# Patient Record
Sex: FEMALE | Race: WHITE | NOT HISPANIC OR LATINO | URBAN - METROPOLITAN AREA
[De-identification: names, ages, dates, MRNs, and addresses within clinical notes are randomized per-mention and may not be internally consistent; named-entity substitution may affect disease eponyms.]

---

## 2020-10-08 ENCOUNTER — INPATIENT (INPATIENT)
Facility: HOSPITAL | Age: 85
LOS: 5 days | Discharge: HOME | DRG: 754 | End: 2020-10-14
Attending: INTERNAL MEDICINE | Admitting: INTERNAL MEDICINE
Payer: MEDICARE

## 2020-10-08 VITALS
RESPIRATION RATE: 16 BRPM | OXYGEN SATURATION: 99 % | SYSTOLIC BLOOD PRESSURE: 188 MMHG | HEART RATE: 69 BPM | HEIGHT: 63 IN | WEIGHT: 98.99 LBS | DIASTOLIC BLOOD PRESSURE: 75 MMHG | TEMPERATURE: 98 F

## 2020-10-08 LAB
ALBUMIN SERPL ELPH-MCNC: 4.1 G/DL — SIGNIFICANT CHANGE UP (ref 3.3–5)
ALP SERPL-CCNC: 67 U/L — SIGNIFICANT CHANGE UP (ref 40–120)
ALT FLD-CCNC: 10 U/L — SIGNIFICANT CHANGE UP (ref 10–45)
ANION GAP SERPL CALC-SCNC: 13 MMOL/L — SIGNIFICANT CHANGE UP (ref 5–17)
ANISOCYTOSIS BLD QL: SLIGHT — SIGNIFICANT CHANGE UP
APTT BLD: 32.9 SEC — SIGNIFICANT CHANGE UP (ref 27.5–35.5)
AST SERPL-CCNC: 16 U/L — SIGNIFICANT CHANGE UP (ref 10–40)
BASOPHILS # BLD AUTO: 0 K/UL — SIGNIFICANT CHANGE UP (ref 0–0.2)
BASOPHILS NFR BLD AUTO: 0 % — SIGNIFICANT CHANGE UP (ref 0–2)
BILIRUB SERPL-MCNC: 0.3 MG/DL — SIGNIFICANT CHANGE UP (ref 0.2–1.2)
BLD GP AB SCN SERPL QL: NEGATIVE — SIGNIFICANT CHANGE UP
BUN SERPL-MCNC: 28 MG/DL — HIGH (ref 7–23)
CALCIUM SERPL-MCNC: 9.2 MG/DL — SIGNIFICANT CHANGE UP (ref 8.4–10.5)
CHLORIDE SERPL-SCNC: 101 MMOL/L — SIGNIFICANT CHANGE UP (ref 96–108)
CO2 SERPL-SCNC: 26 MMOL/L — SIGNIFICANT CHANGE UP (ref 22–31)
CREAT SERPL-MCNC: 1.01 MG/DL — SIGNIFICANT CHANGE UP (ref 0.5–1.3)
ELLIPTOCYTES BLD QL SMEAR: SLIGHT — SIGNIFICANT CHANGE UP
EOSINOPHIL # BLD AUTO: 0 K/UL — SIGNIFICANT CHANGE UP (ref 0–0.5)
EOSINOPHIL NFR BLD AUTO: 0 % — SIGNIFICANT CHANGE UP (ref 0–6)
GLUCOSE SERPL-MCNC: 112 MG/DL — HIGH (ref 70–99)
HCT VFR BLD CALC: 30.1 % — LOW (ref 34.5–45)
HGB BLD-MCNC: 9.2 G/DL — LOW (ref 11.5–15.5)
HYPOCHROMIA BLD QL: SLIGHT — SIGNIFICANT CHANGE UP
INR BLD: 1.06 RATIO — SIGNIFICANT CHANGE UP (ref 0.88–1.16)
LYMPHOCYTES # BLD AUTO: 0.27 K/UL — LOW (ref 1–3.3)
LYMPHOCYTES # BLD AUTO: 2.6 % — LOW (ref 13–44)
MANUAL SMEAR VERIFICATION: SIGNIFICANT CHANGE UP
MCHC RBC-ENTMCNC: 22.2 PG — LOW (ref 27–34)
MCHC RBC-ENTMCNC: 30.6 GM/DL — LOW (ref 32–36)
MCV RBC AUTO: 72.7 FL — LOW (ref 80–100)
MICROCYTES BLD QL: SIGNIFICANT CHANGE UP
MONOCYTES # BLD AUTO: 0.18 K/UL — SIGNIFICANT CHANGE UP (ref 0–0.9)
MONOCYTES NFR BLD AUTO: 1.8 % — LOW (ref 2–14)
NEUTROPHILS # BLD AUTO: 8.95 K/UL — HIGH (ref 1.8–7.4)
NEUTROPHILS NFR BLD AUTO: 87.7 % — HIGH (ref 43–77)
PLAT MORPH BLD: ABNORMAL
PLATELET # BLD AUTO: 367 K/UL — SIGNIFICANT CHANGE UP (ref 150–400)
POIKILOCYTOSIS BLD QL AUTO: SLIGHT — SIGNIFICANT CHANGE UP
POLYCHROMASIA BLD QL SMEAR: SLIGHT — SIGNIFICANT CHANGE UP
POTASSIUM SERPL-MCNC: 4.1 MMOL/L — SIGNIFICANT CHANGE UP (ref 3.5–5.3)
POTASSIUM SERPL-SCNC: 4.1 MMOL/L — SIGNIFICANT CHANGE UP (ref 3.5–5.3)
PROT SERPL-MCNC: 6.7 G/DL — SIGNIFICANT CHANGE UP (ref 6–8.3)
PROTHROM AB SERPL-ACNC: 12.7 SEC — SIGNIFICANT CHANGE UP (ref 10.6–13.6)
RBC # BLD: 4.14 M/UL — SIGNIFICANT CHANGE UP (ref 3.8–5.2)
RBC # FLD: 15.4 % — HIGH (ref 10.3–14.5)
RBC BLD AUTO: ABNORMAL
RH IG SCN BLD-IMP: POSITIVE — SIGNIFICANT CHANGE UP
SCHISTOCYTES BLD QL AUTO: SLIGHT — SIGNIFICANT CHANGE UP
SODIUM SERPL-SCNC: 140 MMOL/L — SIGNIFICANT CHANGE UP (ref 135–145)
VARIANT LYMPHS # BLD: 7.9 % — HIGH (ref 0–6)
WBC # BLD: 10.2 K/UL — SIGNIFICANT CHANGE UP (ref 3.8–10.5)
WBC # FLD AUTO: 10.2 K/UL — SIGNIFICANT CHANGE UP (ref 3.8–10.5)

## 2020-10-08 PROCEDURE — 99285 EMERGENCY DEPT VISIT HI MDM: CPT

## 2020-10-08 PROCEDURE — 74177 CT ABD & PELVIS W/CONTRAST: CPT | Mod: 26

## 2020-10-08 RX ORDER — HALOPERIDOL DECANOATE 100 MG/ML
2 INJECTION INTRAMUSCULAR ONCE
Refills: 0 | Status: DISCONTINUED | OUTPATIENT
Start: 2020-10-08 | End: 2020-10-08

## 2020-10-08 RX ORDER — HALOPERIDOL DECANOATE 100 MG/ML
2 INJECTION INTRAMUSCULAR ONCE
Refills: 0 | Status: COMPLETED | OUTPATIENT
Start: 2020-10-08 | End: 2020-10-08

## 2020-10-08 RX ADMIN — HALOPERIDOL DECANOATE 2 MILLIGRAM(S): 100 INJECTION INTRAMUSCULAR at 23:43

## 2020-10-08 NOTE — ED PROVIDER NOTE - PHYSICAL EXAMINATION
GEN: AxOx3, NAD, non-toxic appearing.  Skin: dry and moist.  HEAD: atraumatic, normocephalic.  EENT: PERRLA, EOMIs.  CHEST: CTAB no wheezes, rhonchi or rales.  CV: RRR no gallops, murmurs or rubs  ABD: No masses, abnormal pulsations or lesions. +BS in all quadrants. NT/ND in all 4 quadrants.   MSK: FROM all extremities

## 2020-10-08 NOTE — ED PROVIDER NOTE - OBJECTIVE STATEMENT
86 y/o F w/ PMHx of HTN HLD presenting with abdominal cramping and vaginal bleeding. Pt accompanied by her daughter who is providing additional information about the pt. The bleeding has been occurring "each month" and has been going on for a few months. The pt is a poor historian who cannot recall if her bleeding is occurring after a BM, urination or if it is menses. She states the blood is bright red and notes it in her underwear. The pt underwent menopause around age 52. 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 Pt denies any fevers, chills, HA, SOB, palpitations, diarrhea, lightheadedness, CP, urinary or bowel incontinence, hx of abdominal surgeries. 86 y/o F w/ PMHx of HTN HLD presenting with abdominal cramping and vaginal bleeding. Pt accompanied by her daughter who is providing additional information about the pt. The bleeding has been occurring "each month" and has been going on for a few months. The pt is a poor historian who cannot recall if her bleeding is occurring after a BM, urination or if it is menses. She states the blood is bright red and notes it in her underwear. The pt underwent menopause around age 52. 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 Pt denies any fevers, chills, HA, SOB, palpitations, diarrhea, lightheadedness, CP, urinary or bowel incontinence, hx of abdominal surgeries. Not on AC.

## 2020-10-08 NOTE — ED PROVIDER NOTE - PROGRESS NOTE DETAILS
Attending MD Philip.  Attempted Dr. Burdick twice and called hospitalist who advised case discussed with dr nicholas, ct with diverticulosis. Attending MD Philip.  Attempted Dr. Burdick twice and called hospitalist who advised admission to Dr. Whitmore.  Pt refusing admission however lacks any clinical decision making capacity as she continues to state she 'just has [her] period' and wants to go home because she is not bleeding from her rectum.  Also forgets why she is here and states she is just here for a checkup.  Daughter present and amenable to admission, pt's  (Rashid) and other daughter (Ida) called as well and counseled re: recs to admit 2/2 concern for diverticular bleed and risk of acute worsening as pt has had several grossly bloody movements in ED though currently remains stable and not requiring acute transfusion.  PT dosed haldol for improved pt care and ability to relax.  Pt signed out to incoming team pending CTAP results and admission to Dr. Whitmore. Stable at time of signout.

## 2020-10-08 NOTE — ED ADULT NURSE NOTE - OBJECTIVE STATEMENT
86 y/o Female PMH HTN and HLD presenting to ED accompanied by her daughter whom is providing additional hx d/t pt being a poor historian c/o abdominal cramping and vaginal bleeding. pt states that the bleeding has been occurring "each month" and has been going on for a few months. d/t pt being unable to provide detailed hx she is unsure when her last bowel movement was. describes the bleeding as being bright red in nature, denies any fevers, chills, weakness, nausea, vomiting, dizziness, blood in her stool, sick contacts or recent travel. VS stable. labs and line obtained, results pending. safety and fall precautions maintained, call bell at the bedside and within reach. daughter remains with the pt at the bedside.

## 2020-10-08 NOTE — ED PROVIDER NOTE - ATTENDING CONTRIBUTION TO CARE
Attending MD Philip.  Agree with above.  Pt has hx of HTN, HLD and hypothyroidism.  She is pleasantly confused and states that her 'bleeding is just [her] period'.  On exam (performed by me with PA student as chaperone and daughter in room) pt has no blood in vaginal vault but gross crimson blood on PARUL.  No active oozing however blood on pt's underwear.  Pt endorses intermittent severe pelvic and lower back cramping as well as bleeding over the last few mos.  Pt denies CP/SOB/light-headedness.  Planned lab work and CTAP.  Pt is not currently anticoagulated.

## 2020-10-09 DIAGNOSIS — N95.0 POSTMENOPAUSAL BLEEDING: ICD-10-CM

## 2020-10-09 DIAGNOSIS — K92.2 GASTROINTESTINAL HEMORRHAGE, UNSPECIFIED: ICD-10-CM

## 2020-10-09 LAB
BASOPHILS # BLD AUTO: 0.06 K/UL — SIGNIFICANT CHANGE UP (ref 0–0.2)
BASOPHILS NFR BLD AUTO: 0.6 % — SIGNIFICANT CHANGE UP (ref 0–2)
EOSINOPHIL # BLD AUTO: 0.14 K/UL — SIGNIFICANT CHANGE UP (ref 0–0.5)
EOSINOPHIL NFR BLD AUTO: 1.4 % — SIGNIFICANT CHANGE UP (ref 0–6)
HCT VFR BLD CALC: 28.9 % — LOW (ref 34.5–45)
HGB BLD-MCNC: 9.3 G/DL — LOW (ref 11.5–15.5)
IMM GRANULOCYTES NFR BLD AUTO: 0.4 % — SIGNIFICANT CHANGE UP (ref 0–1.5)
LYMPHOCYTES # BLD AUTO: 2.42 K/UL — SIGNIFICANT CHANGE UP (ref 1–3.3)
LYMPHOCYTES # BLD AUTO: 24.8 % — SIGNIFICANT CHANGE UP (ref 13–44)
MCHC RBC-ENTMCNC: 23 PG — LOW (ref 27–34)
MCHC RBC-ENTMCNC: 32.2 GM/DL — SIGNIFICANT CHANGE UP (ref 32–36)
MCV RBC AUTO: 71.5 FL — LOW (ref 80–100)
MONOCYTES # BLD AUTO: 1.02 K/UL — HIGH (ref 0–0.9)
MONOCYTES NFR BLD AUTO: 10.4 % — SIGNIFICANT CHANGE UP (ref 2–14)
NEUTROPHILS # BLD AUTO: 6.09 K/UL — SIGNIFICANT CHANGE UP (ref 1.8–7.4)
NEUTROPHILS NFR BLD AUTO: 62.4 % — SIGNIFICANT CHANGE UP (ref 43–77)
NRBC # BLD: 0 /100 WBCS — SIGNIFICANT CHANGE UP (ref 0–0)
PLATELET # BLD AUTO: 377 K/UL — SIGNIFICANT CHANGE UP (ref 150–400)
RBC # BLD: 4.04 M/UL — SIGNIFICANT CHANGE UP (ref 3.8–5.2)
RBC # FLD: 15.4 % — HIGH (ref 10.3–14.5)
SARS-COV-2 IGG SERPL QL IA: NEGATIVE — SIGNIFICANT CHANGE UP
SARS-COV-2 IGM SERPL IA-ACNC: <0.1 INDEX — SIGNIFICANT CHANGE UP
SARS-COV-2 RNA SPEC QL NAA+PROBE: SIGNIFICANT CHANGE UP
WBC # BLD: 9.77 K/UL — SIGNIFICANT CHANGE UP (ref 3.8–10.5)
WBC # FLD AUTO: 9.77 K/UL — SIGNIFICANT CHANGE UP (ref 3.8–10.5)

## 2020-10-09 PROCEDURE — 99233 SBSQ HOSP IP/OBS HIGH 50: CPT

## 2020-10-09 RX ORDER — LANOLIN ALCOHOL/MO/W.PET/CERES
3 CREAM (GRAM) TOPICAL ONCE
Refills: 0 | Status: COMPLETED | OUTPATIENT
Start: 2020-10-09 | End: 2020-10-12

## 2020-10-09 RX ORDER — PANTOPRAZOLE SODIUM 20 MG/1
40 TABLET, DELAYED RELEASE ORAL
Refills: 0 | Status: DISCONTINUED | OUTPATIENT
Start: 2020-10-09 | End: 2020-10-14

## 2020-10-09 RX ORDER — AMLODIPINE BESYLATE 2.5 MG/1
10 TABLET ORAL DAILY
Refills: 0 | Status: DISCONTINUED | OUTPATIENT
Start: 2020-10-09 | End: 2020-10-14

## 2020-10-09 RX ORDER — LEVOTHYROXINE SODIUM 125 MCG
50 TABLET ORAL DAILY
Refills: 0 | Status: DISCONTINUED | OUTPATIENT
Start: 2020-10-09 | End: 2020-10-14

## 2020-10-09 RX ORDER — ATENOLOL 25 MG/1
50 TABLET ORAL DAILY
Refills: 0 | Status: DISCONTINUED | OUTPATIENT
Start: 2020-10-09 | End: 2020-10-14

## 2020-10-09 RX ORDER — POLYETHYLENE GLYCOL 3350 17 G/17G
17 POWDER, FOR SOLUTION ORAL DAILY
Refills: 0 | Status: DISCONTINUED | OUTPATIENT
Start: 2020-10-09 | End: 2020-10-09

## 2020-10-09 RX ORDER — SENNA PLUS 8.6 MG/1
2 TABLET ORAL AT BEDTIME
Refills: 0 | Status: DISCONTINUED | OUTPATIENT
Start: 2020-10-09 | End: 2020-10-09

## 2020-10-09 RX ORDER — ATORVASTATIN CALCIUM 80 MG/1
10 TABLET, FILM COATED ORAL AT BEDTIME
Refills: 0 | Status: DISCONTINUED | OUTPATIENT
Start: 2020-10-09 | End: 2020-10-14

## 2020-10-09 RX ADMIN — ATENOLOL 50 MILLIGRAM(S): 25 TABLET ORAL at 12:51

## 2020-10-09 RX ADMIN — ATORVASTATIN CALCIUM 10 MILLIGRAM(S): 80 TABLET, FILM COATED ORAL at 21:36

## 2020-10-09 RX ADMIN — AMLODIPINE BESYLATE 10 MILLIGRAM(S): 2.5 TABLET ORAL at 12:50

## 2020-10-09 RX ADMIN — Medication 50 MICROGRAM(S): at 12:50

## 2020-10-09 RX ADMIN — PANTOPRAZOLE SODIUM 40 MILLIGRAM(S): 20 TABLET, DELAYED RELEASE ORAL at 12:50

## 2020-10-09 NOTE — H&P ADULT - NSHPPHYSICALEXAM_GEN_ALL_CORE
PHYSICAL EXAMINATION:  Vital Signs Last 24 Hrs  T(C): 36.5 (09 Oct 2020 05:45), Max: 36.9 (08 Oct 2020 22:00)  T(F): 97.7 (09 Oct 2020 05:45), Max: 98.5 (08 Oct 2020 22:00)  HR: 71 (09 Oct 2020 05:45) (62 - 74)  BP: 173/71 (09 Oct 2020 05:45) (138/79 - 188/75)  BP(mean): --  RR: 19 (09 Oct 2020 05:45) (16 - 19)  SpO2: 96% (09 Oct 2020 05:45) (95% - 100%)  CAPILLARY BLOOD GLUCOSE          GENERAL: NAD, well-groomed, well-developed  HEAD:  atraumatic, normocephalic  EYES: sclera anicteric  ENMT: mucous membranes moist  NECK: supple, No JVD  CHEST/LUNG: clear to auscultation bilaterally; no rales, rhonchi, or wheezing b/l  HEART: normal S1, S2  ABDOMEN: BS+, soft, ND, NT   EXTREMITIES:  pulses palpable; no clubbing, cyanosis, or edema b/l LEs  NEURO: awake, alert, interactive; moves all extremities  SKIN: no rashes or lesions PHYSICAL EXAMINATION:  Vital Signs Last 24 Hrs  T(C): 36.5 (09 Oct 2020 05:45), Max: 36.9 (08 Oct 2020 22:00)  T(F): 97.7 (09 Oct 2020 05:45), Max: 98.5 (08 Oct 2020 22:00)  HR: 71 (09 Oct 2020 05:45) (62 - 74)  BP: 173/71 (09 Oct 2020 05:45) (138/79 - 188/75)  BP(mean): --  RR: 19 (09 Oct 2020 05:45) (16 - 19)  SpO2: 96% (09 Oct 2020 05:45) (95% - 100%)  CAPILLARY BLOOD GLUCOSE          GENERAL: NAD, well-groomed, well-developed, seen on 9 [address] with daughter  HEAD:  atraumatic, normocephalic  EYES: sclera anicteric  ENMT: mucous membranes moist  NECK: supple, No JVD  CHEST/LUNG: clear to auscultation bilaterally; no rales, rhonchi, or wheezing b/l  HEART: normal S1, S2  ABDOMEN: BS+, soft, ND, NT   EXTREMITIES:  pulses palpable; no clubbing, cyanosis, or edema b/l LEs  NEURO: awake, alert, interactive; moves all extremities  SKIN: no rashes or lesions

## 2020-10-09 NOTE — CONSULT NOTE ADULT - SUBJECTIVE AND OBJECTIVE BOX
Patient is a 87y old  Female who presents with a chief complaint of GI bleed (09 Oct 2020 08:31)      HPI:  86 y/o female PMH HTN, HLD and dementia presenting with abdominal cramping and vaginal bleeding. Pt accompanied by her daughter who is providing additional information about the pt. The bleeding has been occurring "each month" and has been going on for a few months. The pt is a poor historian who cannot recall if her bleeding is occurring after a BM, urination or if it is menses. She states the blood is bright red and notes it in her underwear. The pt underwent menopause around age 52. 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 Pt denies any fevers, chills, HA, SOB, palpitations, diarrhea, lightheadedness, CP, urinary or bowel incontinence, hx of abdominal surgeries. Not on AC. (09 Oct 2020 08:31)      daughter at bedside to help corroborate history    Pt reports "periods" for several years despite menopause at ~age 52  reported regular daily BMs- pt reports typically light brown in color - but in PARUL in ER noted to have blood/crimson colored)  no nausea or vomiting  no reported passage of clots per rectum  Pt may have had colonoscopy in past, but unclear as per daughter- if had then "decades ago"  no weight  loss, anorexia    Pt reports eating beets weekly - but daughter admits pt is forgetful and not an accurate historian  per daughter, long standing history of anemia  no BM today    PAST MEDICAL & SURGICAL HISTORY:  HLD (hyperlipidemia)  HTN (hypertension)  Dementia      Allergies  No Known Allergies      MEDICATIONS  (STANDING):  amLODIPine   Tablet 10 milliGRAM(s) Oral daily  ATENolol  Tablet 50 milliGRAM(s) Oral daily  atorvastatin 10 milliGRAM(s) Oral at bedtime  levothyroxine 50 MICROGram(s) Oral daily      Social History:   lives with spouse  no tobacco or ETOH    Family History   IBD (  ) Yes   (X  ) No  GI Malignancy (  )  Yes    ( X ) No    Health Management  Last Colonoscopy - unsure of date/if at all, then was decades ago as per daughter at bedside      Advanced Directives: (  X   ) None    (      ) DNR    (     ) DNI    (     ) Health Care Proxy:     Review of Systems:    General:  No wt loss, fevers, chills, night sweats,fatigue  CV:  No pain, palpitations, hypo/hypertension  Resp:  No dyspnea, cough, tachypnea, wheezing  GI:  see HPI  :  No pain, bleeding, incontinence, nocturia  Muscle:  No pain, weakness  Neuro:  No focal weakness, tingling, +dementia  Psych:  No fatigue, insomnia, mood problems, depression  Endocrine:  No polyuria, polydypsia, cold/heat intolerance  Heme:  No petechiae, ecchymosis, easy bruisability  Skin:  No rash, tattoos, scars, edema      Vital Signs Last 24 Hrs  T(C): 36.7 (09 Oct 2020 10:36), Max: 36.9 (08 Oct 2020 22:00)  T(F): 98 (09 Oct 2020 10:36), Max: 98.5 (08 Oct 2020 22:00)  HR: 65 (09 Oct 2020 10:36) (62 - 74)  BP: 107/57 (09 Oct 2020 10:36) (107/57 - 188/75)  BP(mean): --  RR: 18 (09 Oct 2020 10:36) (16 - 19)  SpO2: 98% (09 Oct 2020 10:36) (95% - 100%)    PHYSICAL EXAM:    Constitutional: NAD, well-developed pleasantly confused elderly female. daughter at bedside  Neck: No LAD, supple  Respiratory: Clear b/l  Cardiovascular: S1 and S2, Regular  Gastrointestinal: BS+, soft, NT/ND, neg HSM,  Rectal: normal tone +maroon colored stool, no melena. no palpable lesions  Extremities: No peripheral edema, neg clubbing, cyanosis  Vascular: 2+ peripheral pulses  Neurological: alert and appropriate but forgetful; pleasantly confused. no focal asymmetry  Psychiatric: Normal mood, normal affect  Skin: No rashes        LABS:                        9.3    9.77  )-----------( 377      ( 09 Oct 2020 00:10 )             28.9   Hemoglobin: 9.2 g/dL (10.08.20 @ 20:25)       140  |  101  |  28<H>  ----------------------------<  112<H>  4.1   |  26  |  1.01    Ca    9.2      08 Oct 2020 20:25    TPro  6.7  /  Alb  4.1  /  TBili  0.3  /  DBili  x   /  AST  16  /  ALT  10  /  AlkPhos  67  10-08    PT/INR - ( 08 Oct 2020 20:25 )   PT: 12.7 sec;   INR: 1.06 ratio    PTT - ( 08 Oct 2020 20:25 )  PTT:32.9 sec    COVID-19 PCR . (10.09.20 @ 00:32)   COVID-19 PCR: NotDetec:   RADIOLOGY & ADDITIONAL TESTS:  EXAM:  CT ABDOMEN AND PELVIS IC                        PROCEDURE DATE:  10/08/2020        INTERPRETATION:  CLINICAL INFORMATION: Abdominal pain and vaginal bleeding. Evaluate for acute diverticulitis.    COMPARISON: None.    PROCEDURE:  CT of the Abdomen and Pelvis was performed with intravenous contrast.  Intravenous contrast: 90 ml Omnipaque 350. 10 ml discarded.  Oral contrast: None.  Sagittal and coronal reformats were performed.    FINDINGS: Patient's respiratory motion degrades images.    LOWER CHEST: Elevated right hemidiaphragm. Subsegmental atelectasis.    LIVER: A 2.6 x 2.8 cm cyst in the left lobe. Indeterminate 1.5 x 1.2 cm hypodense lesion with greater than fluid attenuation in the right lobe.  GALLBLADDER/BILE DUCTS: No intrahepatic biliary dilatation. Mild common bile duct dilatation (0.8 cm). Distended gallbladder. No significant gallbladder edema.  PANCREAS: Unremarkable.  SPLEEN: Unremarkable.    ADRENALS: Unremarkable.  KIDNEYS/URETERS: No hydronephrosis, hydroureter or significant perinephric stranding. A 1.7 x 1.1 cm left renal cyst. Subcentimeter hypodense foci in the kidneys, too small to characterize.  BLADDER: Partially distended. Prominent wall.  REPRODUCTIVE ORGANS: Endometrial thickening (1.4 cm).No obvious adnexal mass. Nonspecific calcifications in bilateral adnexa.    BOWEL: No bowel obstruction. Unremarkable appendix. Prominent wall of the cecum/ascending colon junction and proximal sigmoid colon without significant peridiverticular stranding. Colon diverticulosis.  PERITONEUM: No drainable fluid collection or free air.  VESSELS: Atherosclerosis. Normal caliber of the abdominal aorta.  RETROPERITONEUM: No lymphadenopathy.  ABDOMINAL WALL/SOFT TISSUES: Unremarkable.  BONES: Transitional lumbosacral anatomy. S-shaped curvature and degenerative changes of the spine. Nonspecific small sclerotic foci in the proximal femurs.    IMPRESSION:    Colon diverticulosis. Prominent wall of the cecum/ascending colon junction and proximal sigmoidcolon without significant inflammatory change, which may be due to partial distention and/or muscle hypertrophy (less likely diverticulitis). Recommend clinical correlation to assess colitis. Follow-up colonoscopy would be helpful to exclude potential underlying neoplasm.    Thickened endometrium. Neoplasm is suspected in a postmenopausal patient.    Prominent bladder wall, which may be due to partial distention. Recommended correlation with urinalysis to assess urinary tract infection.    Commonbile duct dilatation. Recommend clinical correlation (LFT) and additional imaging (MRCP/MR) if there is clinical suspicion for biliary pathology.    Indeterminate hepatic lesion, which can be characterized on a nonemergent contrast-enhanced MRI.    Additional findings as described.

## 2020-10-09 NOTE — CONSULT NOTE ADULT - SUBJECTIVE AND OBJECTIVE BOX
Gyn Consult Note    LASHANDA MASON  87y  Female [id number]    HPI: 88 y/o  with dementia, LMP age 52, presented to the ED with bleeding of unknown origin.    Patient with limited ability to provide HPI: Patient reports getting her period monthly. Denies menses ever stopping in the past. Denies abnormal bleeding. Patient feels well and is without complaints and wants to leave the hospital. Does not believe she has been having abnormal vaginal bleeding. Patient denies fevers, chills, nausea, vomiting, chest pain, shortness of breath, abdominal pain, urinary symptoms, changes with bowel movements. Patient refusing pelvic exam at time of initial evaluation because she reports seeing gynecologist last month but does not know the name.    Additional history obtained from patient's daughter Norma. Daughter reports that patient lives with her  who is 91 years old.  has medical problems but no dementia. They do not have additional support at home and daughter lives in [place]. Patient has never been formally diagnosed with dementia but has been having cognitive decline in recent years with significant forgetfulness. Patient has not been willing to go to the doctor. Bleeding begam one year ago. Daughter could not give additional information about vaginal bleeding as patient cannot give history to daughter and patient's  also cannot give bleeding history. It was reported that patient experiences cramping at the time of these bleeding episodes.     Name of GYN Physician: none    OBHx:  x2, MAB w/ D&C x2  GYNHx: Denies fibroids, cysts, endometriosis, STI's, Abnormal pap smears   PMH: dementia, HTN, HLD, neck cancer (unknown type, not thyroid per daughter, diagnosed , s/p resection and radiation)  PSH: D&C x2, resection of neck cancer  Meds: Synthroid, Atenolol, Meloxicam, Atorvastatin, Furosemide, Amlodipine, CBD oil   Soc: no substance use, anxiety/depression  Famhx: no family history of breast, uterine, ovarian, colon cancers  HCM: unknown last colonoscopy, mammogram, pap smear - many years ago per daughter    Vital Signs Last 24 Hrs  T(C): 36.7 (09 Oct 2020 17:02), Max: 36.9 (08 Oct 2020 22:00)  T(F): 98.1 (09 Oct 2020 17:02), Max: 98.5 (08 Oct 2020 22:00)  HR: 60 (09 Oct 2020 17:02) (60 - 74)  BP: 129/74 (09 Oct 2020 17:02) (107/57 - 179/73)  BP(mean): --  RR: 18 (09 Oct 2020 17:02) (16 - 19)  SpO2: 96% (09 Oct 2020 17:02) (95% - 100%)    Physical Exam:   General: sitting comfortably in bed, NAD   CV: RRR  Lungs: CTAB  Abd: Soft, non-tender, non-distended  :  No bleeding on pad, patient refused additional exam  Ext: non-tender b/l, no edema     LABS:                              9.3    9.77  )-----------( 377      ( 09 Oct 2020 00:10 )             28.9     10-08    140  |  101  |  28<H>  ----------------------------<  112<H>  4.1   |  26  |  1.01    Ca    9.2      08 Oct 2020 20:25    TPro  6.7  /  Alb  4.1  /  TBili  0.3  /  DBili  x   /  AST  16  /  ALT  10  /  AlkPhos  67  10-08    I&O's Detail    08 Oct 2020 07:01  -  09 Oct 2020 07:00  --------------------------------------------------------  IN:  Total IN: 0 mL    OUT:    Oral Fluid: 0 mL  Total OUT: 0 mL    Total NET: 0 mL      09 Oct 2020 07:01  -  09 Oct 2020 18:19  --------------------------------------------------------  IN:    Oral Fluid: 420 mL  Total IN: 420 mL    OUT:  Total OUT: 0 mL    Total NET: 420 mL        PT/INR - ( 08 Oct 2020 20:25 )   PT: 12.7 sec;   INR: 1.06 ratio         PTT - ( 08 Oct 2020 20:25 )  PTT:32.9 sec    < from: CT Abdomen and Pelvis w/ IV Cont (10.08.20 @ 21:48) >    EXAM:  CT ABDOMEN AND PELVIS IC                            PROCEDURE DATE:  10/08/2020            INTERPRETATION:  CLINICAL INFORMATION: Abdominal pain and vaginal bleeding. Evaluate for acute diverticulitis.    COMPARISON: None.    PROCEDURE:  CT of the Abdomen and Pelvis was performed with intravenous contrast.  Intravenous contrast: 90 ml Omnipaque 350. 10 ml discarded.  Oral contrast: None.  Sagittal and coronal reformats were performed.    FINDINGS: Patient's respiratory motion degrades images.    LOWER CHEST: Elevated right hemidiaphragm. Subsegmental atelectasis.    LIVER: A 2.6 x 2.8 cm cyst in the left lobe. Indeterminate 1.5 x 1.2 cm hypodense lesion with greater than fluid attenuation in the right lobe.  GALLBLADDER/BILE DUCTS: No intrahepatic biliary dilatation. Mild common bile duct dilatation (0.8 cm). Distended gallbladder. No significant gallbladder edema.  PANCREAS: Unremarkable.  SPLEEN: Unremarkable.    ADRENALS: Unremarkable.  KIDNEYS/URETERS: No hydronephrosis, hydroureter or significant perinephric stranding. A 1.7 x 1.1 cm left renal cyst. Subcentimeter hypodense foci in the kidneys, too small to characterize.  BLADDER: Partially distended. Prominent wall.  REPRODUCTIVE ORGANS: Endometrial thickening (1.4 cm).No obvious adnexal mass. Nonspecific calcifications in bilateral adnexa.    BOWEL: No bowel obstruction. Unremarkable appendix. Prominent wall of the cecum/ascending colon junction and proximal sigmoid colon without significant peridiverticular stranding. Colon diverticulosis.  PERITONEUM: No drainable fluid collection or free air.  VESSELS: Atherosclerosis. Normal caliber of the abdominal aorta.  RETROPERITONEUM: No lymphadenopathy.  ABDOMINAL WALL/SOFT TISSUES: Unremarkable.  BONES: Transitional lumbosacral anatomy. S-shaped curvature and degenerative changes of the spine. Nonspecific small sclerotic foci in the proximal femurs.    IMPRESSION:    Colon diverticulosis. Prominent wall of the cecum/ascending colon junction and proximal sigmoidcolon without significant inflammatory change, which may be due to partial distention and/or muscle hypertrophy (less likely diverticulitis). Recommend clinical correlation to assess colitis. Follow-up colonoscopy would be helpful to exclude potential underlying neoplasm.    Thickened endometrium. Neoplasm is suspected in a postmenopausal patient.    Prominent bladder wall, which may be due to partial distention. Recommended correlation with urinalysis to assess urinary tract infection.    Commonbile duct dilatation. Recommend clinical correlation (LFT) and additional imaging (MRCP/MR) if there is clinical suspicion for biliary pathology.    Indeterminate hepatic lesion, which can be characterized on a nonemergent contrast-enhanced MRI.    Additional findings as described.      < end of copied text >   Gyn Consult Note    LASHANDA MASON  87y  Female [id number]    HPI: 86 y/o  with dementia, LMP age 52, presented to the ED with bleeding of unknown origin.    Patient with limited ability to provide HPI: Patient reports getting her period monthly. Denies menses ever stopping in the past. Denies abnormal bleeding. Patient feels well and is without complaints and wants to leave the hospital. Does not believe she has been having abnormal vaginal bleeding. Patient denies fevers, chills, nausea, vomiting, chest pain, shortness of breath, abdominal pain, urinary symptoms, changes with bowel movements. Patient refusing pelvic exam at time of initial evaluation because she reports seeing gynecologist last month but does not know the name.    Additional history obtained from patient's daughter Norma. Daughter reports that patient lives with her  who is 91 years old.  has medical problems but no dementia. They do not have additional support at home and daughter lives in [place]. Patient has never been formally diagnosed with dementia but has been having cognitive decline in recent years with significant forgetfulness. Patient has not been willing to go to the doctor. Bleeding began one year ago. Daughter could not give additional information about bleeding timing or volume. Patient's  also could not give bleeding history per daughter. It was reported that patient experiences cramping at the time of these bleeding episodes.     Name of GYN Physician: none    OBHx:  x2, MAB w/ D&C x2  GYNHx: Denies fibroids, cysts, endometriosis, STI's, Abnormal pap smears   PMH: dementia, HTN, HLD, neck cancer (unknown type, not thyroid per daughter, diagnosed , s/p resection and radiation)  PSH: D&C x2, resection of neck cancer  Meds: Synthroid, Atenolol, Meloxicam, Atorvastatin, Furosemide, Amlodipine, CBD oil   Soc: no substance use, anxiety/depression  Famhx: no family history of breast, uterine, ovarian, colon cancers  HCM: unknown last colonoscopy, mammogram, pap smear - many years ago per daughter    Vital Signs Last 24 Hrs  T(C): 36.7 (09 Oct 2020 17:02), Max: 36.9 (08 Oct 2020 22:00)  T(F): 98.1 (09 Oct 2020 17:02), Max: 98.5 (08 Oct 2020 22:00)  HR: 60 (09 Oct 2020 17:02) (60 - 74)  BP: 129/74 (09 Oct 2020 17:02) (107/57 - 179/73)  BP(mean): --  RR: 18 (09 Oct 2020 17:02) (16 - 19)  SpO2: 96% (09 Oct 2020 17:02) (95% - 100%)    Physical Exam:   General: sitting comfortably in bed, NAD   CV: RRR  Lungs: CTAB  Abd: Soft, non-tender, non-distended  :  No bleeding on pad, patient refused additional exam  Ext: non-tender b/l, no edema     LABS:                              9.3    9.77  )-----------( 377      ( 09 Oct 2020 00:10 )             28.9     10-08    140  |  101  |  28<H>  ----------------------------<  112<H>  4.1   |  26  |  1.01    Ca    9.2      08 Oct 2020 20:25    TPro  6.7  /  Alb  4.1  /  TBili  0.3  /  DBili  x   /  AST  16  /  ALT  10  /  AlkPhos  67  10-08    I&O's Detail    08 Oct 2020 07:01  -  09 Oct 2020 07:00  --------------------------------------------------------  IN:  Total IN: 0 mL    OUT:    Oral Fluid: 0 mL  Total OUT: 0 mL    Total NET: 0 mL      09 Oct 2020 07:01  -  09 Oct 2020 18:19  --------------------------------------------------------  IN:    Oral Fluid: 420 mL  Total IN: 420 mL    OUT:  Total OUT: 0 mL    Total NET: 420 mL        PT/INR - ( 08 Oct 2020 20:25 )   PT: 12.7 sec;   INR: 1.06 ratio         PTT - ( 08 Oct 2020 20:25 )  PTT:32.9 sec    < from: CT Abdomen and Pelvis w/ IV Cont (10.08.20 @ 21:48) >    EXAM:  CT ABDOMEN AND PELVIS IC                            PROCEDURE DATE:  10/08/2020            INTERPRETATION:  CLINICAL INFORMATION: Abdominal pain and vaginal bleeding. Evaluate for acute diverticulitis.    COMPARISON: None.    PROCEDURE:  CT of the Abdomen and Pelvis was performed with intravenous contrast.  Intravenous contrast: 90 ml Omnipaque 350. 10 ml discarded.  Oral contrast: None.  Sagittal and coronal reformats were performed.    FINDINGS: Patient's respiratory motion degrades images.    LOWER CHEST: Elevated right hemidiaphragm. Subsegmental atelectasis.    LIVER: A 2.6 x 2.8 cm cyst in the left lobe. Indeterminate 1.5 x 1.2 cm hypodense lesion with greater than fluid attenuation in the right lobe.  GALLBLADDER/BILE DUCTS: No intrahepatic biliary dilatation. Mild common bile duct dilatation (0.8 cm). Distended gallbladder. No significant gallbladder edema.  PANCREAS: Unremarkable.  SPLEEN: Unremarkable.    ADRENALS: Unremarkable.  KIDNEYS/URETERS: No hydronephrosis, hydroureter or significant perinephric stranding. A 1.7 x 1.1 cm left renal cyst. Subcentimeter hypodense foci in the kidneys, too small to characterize.  BLADDER: Partially distended. Prominent wall.  REPRODUCTIVE ORGANS: Endometrial thickening (1.4 cm).No obvious adnexal mass. Nonspecific calcifications in bilateral adnexa.    BOWEL: No bowel obstruction. Unremarkable appendix. Prominent wall of the cecum/ascending colon junction and proximal sigmoid colon without significant peridiverticular stranding. Colon diverticulosis.  PERITONEUM: No drainable fluid collection or free air.  VESSELS: Atherosclerosis. Normal caliber of the abdominal aorta.  RETROPERITONEUM: No lymphadenopathy.  ABDOMINAL WALL/SOFT TISSUES: Unremarkable.  BONES: Transitional lumbosacral anatomy. S-shaped curvature and degenerative changes of the spine. Nonspecific small sclerotic foci in the proximal femurs.    IMPRESSION:    Colon diverticulosis. Prominent wall of the cecum/ascending colon junction and proximal sigmoidcolon without significant inflammatory change, which may be due to partial distention and/or muscle hypertrophy (less likely diverticulitis). Recommend clinical correlation to assess colitis. Follow-up colonoscopy would be helpful to exclude potential underlying neoplasm.    Thickened endometrium. Neoplasm is suspected in a postmenopausal patient.    Prominent bladder wall, which may be due to partial distention. Recommended correlation with urinalysis to assess urinary tract infection.    Commonbile duct dilatation. Recommend clinical correlation (LFT) and additional imaging (MRCP/MR) if there is clinical suspicion for biliary pathology.    Indeterminate hepatic lesion, which can be characterized on a nonemergent contrast-enhanced MRI.    Additional findings as described.      < end of copied text >   Gyn Consult Note    LASHANDA MASON  87y  Female [id number]    HPI: 88 y/o  with dementia, LMP age 52, presented to the ED with bleeding of unknown origin.    Patient with limited ability to provide HPI: Patient reports getting her period monthly. Denies menses ever stopping in the past. Denies abnormal bleeding. Patient feels well and is without complaints and wants to leave the hospital. Does not believe she has been having abnormal vaginal bleeding. Patient denies fevers, chills, nausea, vomiting, chest pain, shortness of breath, abdominal pain, urinary symptoms, changes with bowel movements. Patient refusing pelvic exam at time of initial evaluation because she reports seeing gynecologist last month but does not know the name.    Additional history obtained from patient's daughter Norma. Daughter reports that patient lives with her  who is 91 years old.  has medical problems but no dementia. They do not have additional support at home and daughter lives in [place]. Patient has never been formally diagnosed with dementia but has been having cognitive decline in recent years with significant forgetfulness. Patient has not been willing to go to the doctor. Family says that bleeding (which they believe to be uterine in origin as patient is constantly described the bleeding as pain as part of menses) began within the last year. Daughter could not give additional information about bleeding timing or volume. Patient's  also could not give bleeding history per daughter. It was reported that patient experiences substantial pelvic cramping at the time of these bleeding episodes.     Name of GYN Physician: none    OBHx:  x2, MAB w/ D&C x2  GYNHx: Denies fibroids, cysts, endometriosis, STI's, Abnormal pap smears   PMH: dementia, HTN, HLD, neck cancer (unknown type, not thyroid per daughter, diagnosed , s/p resection and radiation)  PSH: D&C x2, resection of neck cancer  Meds: Synthroid, Atenolol, Meloxicam, Atorvastatin, Furosemide, Amlodipine, CBD oil   Soc: no substance use, anxiety/depression  Famhx: no family history of breast, uterine, ovarian, colon cancers  HCM: unknown last colonoscopy, mammogram, pap smear - many years ago per daughter    Vital Signs Last 24 Hrs  T(C): 36.7 (09 Oct 2020 17:02), Max: 36.9 (08 Oct 2020 22:00)  T(F): 98.1 (09 Oct 2020 17:02), Max: 98.5 (08 Oct 2020 22:00)  HR: 60 (09 Oct 2020 17:02) (60 - 74)  BP: 129/74 (09 Oct 2020 17:02) (107/57 - 179/73)  BP(mean): --  RR: 18 (09 Oct 2020 17:02) (16 - 19)  SpO2: 96% (09 Oct 2020 17:02) (95% - 100%)    Physical Exam:   General: sitting comfortably in bed, NAD   CV: RRR  Lungs: CTAB  Abd: Soft, non-tender, non-distended  :  No bleeding on pad, patient refused additional exam  Ext: non-tender b/l, no edema     LABS:                              9.3    9.77  )-----------( 377      ( 09 Oct 2020 00:10 )             28.9     10-08    140  |  101  |  28<H>  ----------------------------<  112<H>  4.1   |  26  |  1.01    Ca    9.2      08 Oct 2020 20:25    TPro  6.7  /  Alb  4.1  /  TBili  0.3  /  DBili  x   /  AST  16  /  ALT  10  /  AlkPhos  67  10-08    I&O's Detail    08 Oct 2020 07:01  -  09 Oct 2020 07:00  --------------------------------------------------------  IN:  Total IN: 0 mL    OUT:    Oral Fluid: 0 mL  Total OUT: 0 mL    Total NET: 0 mL      09 Oct 2020 07:01  -  09 Oct 2020 18:19  --------------------------------------------------------  IN:    Oral Fluid: 420 mL  Total IN: 420 mL    OUT:  Total OUT: 0 mL    Total NET: 420 mL        PT/INR - ( 08 Oct 2020 20:25 )   PT: 12.7 sec;   INR: 1.06 ratio         PTT - ( 08 Oct 2020 20:25 )  PTT:32.9 sec    < from: CT Abdomen and Pelvis w/ IV Cont (10.08.20 @ 21:48) >    EXAM:  CT ABDOMEN AND PELVIS IC                            PROCEDURE DATE:  10/08/2020            INTERPRETATION:  CLINICAL INFORMATION: Abdominal pain and vaginal bleeding. Evaluate for acute diverticulitis.    COMPARISON: None.    PROCEDURE:  CT of the Abdomen and Pelvis was performed with intravenous contrast.  Intravenous contrast: 90 ml Omnipaque 350. 10 ml discarded.  Oral contrast: None.  Sagittal and coronal reformats were performed.    FINDINGS: Patient's respiratory motion degrades images.    LOWER CHEST: Elevated right hemidiaphragm. Subsegmental atelectasis.    LIVER: A 2.6 x 2.8 cm cyst in the left lobe. Indeterminate 1.5 x 1.2 cm hypodense lesion with greater than fluid attenuation in the right lobe.  GALLBLADDER/BILE DUCTS: No intrahepatic biliary dilatation. Mild common bile duct dilatation (0.8 cm). Distended gallbladder. No significant gallbladder edema.  PANCREAS: Unremarkable.  SPLEEN: Unremarkable.    ADRENALS: Unremarkable.  KIDNEYS/URETERS: No hydronephrosis, hydroureter or significant perinephric stranding. A 1.7 x 1.1 cm left renal cyst. Subcentimeter hypodense foci in the kidneys, too small to characterize.  BLADDER: Partially distended. Prominent wall.  REPRODUCTIVE ORGANS: Endometrial thickening (1.4 cm).No obvious adnexal mass. Nonspecific calcifications in bilateral adnexa.    BOWEL: No bowel obstruction. Unremarkable appendix. Prominent wall of the cecum/ascending colon junction and proximal sigmoid colon without significant peridiverticular stranding. Colon diverticulosis.  PERITONEUM: No drainable fluid collection or free air.  VESSELS: Atherosclerosis. Normal caliber of the abdominal aorta.  RETROPERITONEUM: No lymphadenopathy.  ABDOMINAL WALL/SOFT TISSUES: Unremarkable.  BONES: Transitional lumbosacral anatomy. S-shaped curvature and degenerative changes of the spine. Nonspecific small sclerotic foci in the proximal femurs.    IMPRESSION:    Colon diverticulosis. Prominent wall of the cecum/ascending colon junction and proximal sigmoidcolon without significant inflammatory change, which may be due to partial distention and/or muscle hypertrophy (less likely diverticulitis). Recommend clinical correlation to assess colitis. Follow-up colonoscopy would be helpful to exclude potential underlying neoplasm.    Thickened endometrium. Neoplasm is suspected in a postmenopausal patient.    Prominent bladder wall, which may be due to partial distention. Recommended correlation with urinalysis to assess urinary tract infection.    Commonbile duct dilatation. Recommend clinical correlation (LFT) and additional imaging (MRCP/MR) if there is clinical suspicion for biliary pathology.    Indeterminate hepatic lesion, which can be characterized on a nonemergent contrast-enhanced MRI.    Additional findings as described.      < end of copied text >

## 2020-10-09 NOTE — CONSULT NOTE ADULT - ASSESSMENT
A/P: 88 y/o  with dementia, LMP age 52, presented to the ED with bleeding of unknown origin. In ED pelvic exam performed without blood in vagina vault, crimson blood noted on digital rectal exam. GI consulted. Patient with more chronic history of abnormal bleeding, though to be uterine in origin by patient and family. CT revealed thickened endometrial lining concerning for neoplasm. GYN consulted. VSS. H/h stable. No bleeding on pad at time of evaluation. Patient refused additional pelvic exam.    - Recommend transvaginal ultrasound to better visualize the uterus and adnexa  - Daughter requesting second attempt at pelvic exam when she is bedside, will see if patient is amenable at that time  - Consider endometrial biopsy pending pelvic exam and ultrasound findings    CAITLIN Morgan PGY3  d/w Dr. Luke A/P: 88 y/o  with dementia, LMP age 52, presented to the ED with bleeding of unknown origin. In ED pelvic exam performed without blood in vagina vault, crimson blood noted on digital rectal exam. GI consulted. Patient with history suspicious for abnormal uterine bleeding as well. CT revealed thickened endometrial lining concerning for neoplasm. GYN consulted. VSS. H/h stable. No bleeding on pad at time of evaluation. Patient refused additional pelvic exam.    - Recommend transvaginal ultrasound to better visualize the uterus and adnexa  - Daughter requesting second attempt at pelvic exam when she is bedside, will see if patient is amenable at that time  - Consider endometrial biopsy pending pelvic exam and ultrasound findings to rule out malignancy    CAITLIN Morgan PGY3  d/w Dr. Luke A/P: 88 y/o  with dementia, LMP age 52, presented to the ED with bleeding of unknown origin. In ED pelvic exam performed without blood in vagina vault, crimson blood noted on digital rectal exam. GI consulted. Patient with history suspicious for abnormal uterine bleeding over the last year. CT revealed thickened endometrial lining concerning for neoplasm. GYN consulted. VSS. H/h stable. No bleeding on pad at time of evaluation. Patient refused additional pelvic exam.    - Recommend transvaginal ultrasound to better evaluate the uterus and adnexa  - Daughter requesting second attempt at pelvic exam when she is bedside, will see if patient is amenable at that time  - Consider endometrial biopsy pending pelvic exam and ultrasound findings to rule out malignancy    CAITLIN Morgan PGY3  d/w Dr. Luke

## 2020-10-09 NOTE — H&P ADULT - ASSESSMENT
86 y/o female PMH HTN, HLD presenting with abdominal cramping and vaginal bleeding. Pt accompanied by her daughter who is providing additional information about the pt. The bleeding has been occurring "each month" and has been going on for a few months. The pt is a poor historian who cannot recall if her bleeding is occurring after a BM, urination or if it is menses. She states the blood is bright red and notes it in her underwear. The pt underwent menopause around age 52. 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 Pt denies any fevers, chills, HA, SOB, palpitations, diarrhea, lightheadedness, CP, urinary or bowel incontinence, hx of abdominal surgeries. Not on AC. 88 y/o female PMH HTN, HLD presenting with abdominal cramping and vaginal bleeding. Pt accompanied by her daughter who is providing additional information about the pt. The bleeding has been occurring "each month" and has been going on for a few months. The pt is a poor historian who cannot recall if her bleeding is occurring after a BM, urination or if it is menses. She states the blood is bright red and notes it in her underwear. The pt underwent menopause around age 52. 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 Pt denies any fevers, chills, HA, SOB, palpitations, diarrhea, lightheadedness, CP, urinary or bowel incontinence, hx of abdominal surgeries. Not on AC.    GI was called. Stool guiac. CBC in AM. Regular diet. Observe for now.     Will call GYN to eval Pelvic sonogram ordered.     CV: Continue all BP meds and HLD meds from home.     Family does not want aggressive eval given dementia. 86 y/o female PMH HTN, HLD presenting with abdominal cramping and vaginal bleeding. Pt accompanied by her daughter who is providing additional information about the pt. The bleeding has been occurring "each month" and has been going on for a few months. The pt is a poor historian who cannot recall if her bleeding is occurring after a BM, urination or if it is menses. She states the blood is bright red and notes it in her underwear. The pt underwent menopause around age 52. 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 Pt denies any fevers, chills, HA, SOB, palpitations, diarrhea, lightheadedness, CP, urinary or bowel incontinence, hx of abdominal surgeries. Not on AC.    Plan: GI was called this AM. Stool guiac requested. CBC in AM. Regular diet. Observe for now. HGB acceptable 9.3, yesterday was 9.2.    CT notes mostly diverticulosis and endometrial thickening, no clear diverticulitis. Hold ABX for now. Add bowl regimen.       Will call GYN to eval possible endometrial bleeding. Pelvic sonogram ordered.     CV: Continue all BP meds Atenolol 50 mg/day, Norvasc 10 mg/day and Lipitor 10 mg/day. Continue Synthyroid 50 mcg/day, TSH in AM.        Family does not want aggressive eval, favors avoiding procedures if possible given dementia. 88 y/o female PMH HTN, HLD presenting with abdominal cramping and vaginal bleeding. Pt accompanied by her daughter who is providing additional information about the pt. The bleeding has been occurring "each month" and has been going on for a few months. The pt is a poor historian who cannot recall if her bleeding is occurring after a BM, urination or if it is menses. She states the blood is bright red and notes it in her underwear. The pt underwent menopause around age 52. 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 Pt denies any fevers, chills, HA, SOB, palpitations, diarrhea, lightheadedness, CP, urinary or bowel incontinence, hx of abdominal surgeries. Not on AC.    Plan: GI was called this AM. Stool guiac requested. CBC in AM. Regular diet. Observe for now. HGB acceptable 9.3, yesterday was 9.2.    CT notes mostly diverticulosis and endometrial thickening, no clear diverticulitis. Hold ABX for now. Add bowl regimen.  CEA blood tumor marker  will be sent for the AM.       Will call GYN to eval possible endometrial bleeding. Pelvic sonogram ordered.      CV: Continue all BP meds Atenolol 50 mg/day, Norvasc 10 mg/day and Lipitor 10 mg/day. Continue Synthyroid 50 mcg/day, TSH in AM.        Family does not want aggressive eval, favors avoiding procedures if possible given dementia.

## 2020-10-09 NOTE — CONSULT NOTE ADULT - ASSESSMENT
86 y/o female PMH HTN, HLD and dementia presenting with abdominal cramping and vaginal bleeding    CT + Colon diverticulosis. Prominent wall of the cecum/ascending colon junction and proximal sigmoid colon, Thickened endometrium, CBD 8mm    Biliary dilation - age appropriate and normal LFTS with benign exam and tolerating PO diet; no need for MRCP clinically    GI bleed - HD stable ?diverticular vs underlying neoplasm given CT findings  -monitor CBC and BMs  -keep active type and screen, transfuse PRN if symptomatic or Hgb <8  -add PO PPI  -d/w daughter; she will discuss with pt's  and family will decide over the weekend if they wish to pursue colonoscopy.  If they agree to have pt stay in hospital and have colonoscopy, can plan for Tuesday  -family agreeable to check CEA (tumor marker) in AM (ordered)  -ok for PO diet as tolerated    Endometrial thickening with reported vaginal bleeding in post-menopausal female   -await GYN input (called by primary team)    Discussed with Medicine attending  Discussed with pt. and daughter at bedside, all questions answered    Thank you for the courtesy of this consult.  Please call over weekend prn with acute GI concerns   GI service : [phone]    Andrew Issa PA-C    [facility]  [phone]  After hours and weekend coverage [phone]

## 2020-10-09 NOTE — H&P ADULT - NSHPLABSRESULTS_GEN_ALL_CORE
LABS:                        9.3    9.77  )-----------( 377      ( 09 Oct 2020 00:10 )             28.9     10-08    140  |  101  |  28<H>  ----------------------------<  112<H>  4.1   |  26  |  1.01    Ca    9.2      08 Oct 2020 20:25    TPro  6.7  /  Alb  4.1  /  TBili  0.3  /  DBili  x   /  AST  16  /  ALT  10  /  AlkPhos  67  10-08    PT/INR - ( 08 Oct 2020 20:25 )   PT: 12.7 sec;   INR: 1.06 ratio         PTT - ( 08 Oct 2020 20:25 )  PTT:32.9 sec        RADIOLOGY & ADDITIONAL TESTS:

## 2020-10-09 NOTE — H&P ADULT - HISTORY OF PRESENT ILLNESS
86 y/o female PMH HTN, HLD presenting with abdominal cramping and vaginal bleeding. Pt accompanied by her daughter who is providing additional information about the pt. The bleeding has been occurring "each month" and has been going on for a few months. The pt is a poor historian who cannot recall if her bleeding is occurring after a BM, urination or if it is menses. She states the blood is bright red and notes it in her underwear. The pt underwent menopause around age 52. 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 Pt denies any fevers, chills, HA, SOB, palpitations, diarrhea, lightheadedness, CP, urinary or bowel incontinence, hx of abdominal surgeries. Not on AC.

## 2020-10-09 NOTE — CONSULT NOTE ADULT - ATTENDING COMMENTS
abdominal cramps, vaginal bleeding, abnormal colon on ct scan. patient with dementia but verbal.    plan await family consent regarding colonoscopy           gyn evaluation

## 2020-10-10 ENCOUNTER — TRANSCRIPTION ENCOUNTER (OUTPATIENT)
Age: 85
End: 2020-10-10

## 2020-10-10 LAB
ANION GAP SERPL CALC-SCNC: 12 MMOL/L — SIGNIFICANT CHANGE UP (ref 5–17)
BUN SERPL-MCNC: 27 MG/DL — HIGH (ref 7–23)
CALCIUM SERPL-MCNC: 9.3 MG/DL — SIGNIFICANT CHANGE UP (ref 8.4–10.5)
CEA SERPL-MCNC: 3.2 NG/ML — SIGNIFICANT CHANGE UP (ref 0–3.8)
CHLORIDE SERPL-SCNC: 105 MMOL/L — SIGNIFICANT CHANGE UP (ref 96–108)
CO2 SERPL-SCNC: 24 MMOL/L — SIGNIFICANT CHANGE UP (ref 22–31)
CREAT SERPL-MCNC: 1.1 MG/DL — SIGNIFICANT CHANGE UP (ref 0.5–1.3)
GLUCOSE SERPL-MCNC: 95 MG/DL — SIGNIFICANT CHANGE UP (ref 70–99)
HCT VFR BLD CALC: 29.3 % — LOW (ref 34.5–45)
HGB BLD-MCNC: 9 G/DL — LOW (ref 11.5–15.5)
MCHC RBC-ENTMCNC: 22.4 PG — LOW (ref 27–34)
MCHC RBC-ENTMCNC: 30.7 GM/DL — LOW (ref 32–36)
MCV RBC AUTO: 73.1 FL — LOW (ref 80–100)
NRBC # BLD: 0 /100 WBCS — SIGNIFICANT CHANGE UP (ref 0–0)
PLATELET # BLD AUTO: 353 K/UL — SIGNIFICANT CHANGE UP (ref 150–400)
POTASSIUM SERPL-MCNC: 3.9 MMOL/L — SIGNIFICANT CHANGE UP (ref 3.5–5.3)
POTASSIUM SERPL-SCNC: 3.9 MMOL/L — SIGNIFICANT CHANGE UP (ref 3.5–5.3)
RBC # BLD: 4.01 M/UL — SIGNIFICANT CHANGE UP (ref 3.8–5.2)
RBC # FLD: 15.6 % — HIGH (ref 10.3–14.5)
SODIUM SERPL-SCNC: 141 MMOL/L — SIGNIFICANT CHANGE UP (ref 135–145)
TSH SERPL-MCNC: 5.87 UIU/ML — HIGH (ref 0.27–4.2)
WBC # BLD: 13.65 K/UL — HIGH (ref 3.8–10.5)
WBC # FLD AUTO: 13.65 K/UL — HIGH (ref 3.8–10.5)

## 2020-10-10 RX ORDER — LANOLIN ALCOHOL/MO/W.PET/CERES
1 CREAM (GRAM) TOPICAL
Qty: 0 | Refills: 0 | DISCHARGE
Start: 2020-10-10

## 2020-10-10 RX ORDER — LEVOTHYROXINE SODIUM 125 MCG
1 TABLET ORAL
Qty: 0 | Refills: 0 | DISCHARGE
Start: 2020-10-10

## 2020-10-10 RX ORDER — AMLODIPINE BESYLATE 2.5 MG/1
1 TABLET ORAL
Qty: 0 | Refills: 0 | DISCHARGE
Start: 2020-10-10

## 2020-10-10 RX ORDER — ATENOLOL 25 MG/1
1 TABLET ORAL
Qty: 0 | Refills: 0 | DISCHARGE
Start: 2020-10-10

## 2020-10-10 RX ORDER — SENNA PLUS 8.6 MG/1
2 TABLET ORAL
Qty: 0 | Refills: 0 | DISCHARGE
Start: 2020-10-10

## 2020-10-10 RX ORDER — POLYETHYLENE GLYCOL 3350 17 G/17G
17 POWDER, FOR SOLUTION ORAL
Qty: 0 | Refills: 0 | DISCHARGE
Start: 2020-10-10

## 2020-10-10 RX ORDER — POLYETHYLENE GLYCOL 3350 17 G/17G
17 POWDER, FOR SOLUTION ORAL
Refills: 0 | Status: DISCONTINUED | OUTPATIENT
Start: 2020-10-10 | End: 2020-10-10

## 2020-10-10 RX ORDER — SENNA PLUS 8.6 MG/1
2 TABLET ORAL AT BEDTIME
Refills: 0 | Status: DISCONTINUED | OUTPATIENT
Start: 2020-10-10 | End: 2020-10-14

## 2020-10-10 RX ORDER — ATORVASTATIN CALCIUM 80 MG/1
1 TABLET, FILM COATED ORAL
Qty: 0 | Refills: 0 | DISCHARGE
Start: 2020-10-10

## 2020-10-10 RX ORDER — POLYETHYLENE GLYCOL 3350 17 G/17G
17 POWDER, FOR SOLUTION ORAL
Refills: 0 | Status: DISCONTINUED | OUTPATIENT
Start: 2020-10-10 | End: 2020-10-14

## 2020-10-10 RX ADMIN — AMLODIPINE BESYLATE 10 MILLIGRAM(S): 2.5 TABLET ORAL at 06:13

## 2020-10-10 RX ADMIN — ATENOLOL 50 MILLIGRAM(S): 25 TABLET ORAL at 06:13

## 2020-10-10 RX ADMIN — SENNA PLUS 2 TABLET(S): 8.6 TABLET ORAL at 21:35

## 2020-10-10 RX ADMIN — Medication 50 MICROGRAM(S): at 06:13

## 2020-10-10 RX ADMIN — PANTOPRAZOLE SODIUM 40 MILLIGRAM(S): 20 TABLET, DELAYED RELEASE ORAL at 06:12

## 2020-10-10 RX ADMIN — ATORVASTATIN CALCIUM 10 MILLIGRAM(S): 80 TABLET, FILM COATED ORAL at 21:35

## 2020-10-10 RX ADMIN — POLYETHYLENE GLYCOL 3350 17 GRAM(S): 17 POWDER, FOR SOLUTION ORAL at 18:11

## 2020-10-10 NOTE — DISCHARGE NOTE PROVIDER - CARE PROVIDER_API CALL
Kasi Triana  CARDIOVASCULAR DISEASE  [address], [address]  [place] [address]  Phone: [phone]  Fax: [phone]  Follow Up Time: 1 week    Antolin Rico  GASTROENTEROLOGY  [address], [address]  [place], [place] [id number]  Phone: [phone]  Fax: [phone]  Follow Up Time:

## 2020-10-10 NOTE — PROGRESS NOTE ADULT - SUBJECTIVE AND OBJECTIVE BOX
INTERVAL HPI/OVERNIGHT EVENTS:  Pt seen and examined at bedside.     Allergies/Intolerance: No Known Allergies      MEDICATIONS  (STANDING):  amLODIPine   Tablet 10 milliGRAM(s) Oral daily  ATENolol  Tablet 50 milliGRAM(s) Oral daily  atorvastatin 10 milliGRAM(s) Oral at bedtime  levothyroxine 50 MICROGram(s) Oral daily  melatonin 3 milliGRAM(s) Oral once  pantoprazole    Tablet 40 milliGRAM(s) Oral before breakfast    MEDICATIONS  (PRN):        ROS: all systems reviewed and wnl      PHYSICAL EXAMINATION:  Vital Signs Last 24 Hrs  T(C): 36.7 (10 Oct 2020 05:22), Max: 36.7 (09 Oct 2020 10:36)  T(F): 98.1 (10 Oct 2020 05:22), Max: 98.1 (09 Oct 2020 13:22)  HR: 72 (10 Oct 2020 05:22) (60 - 72)  BP: 160/63 (10 Oct 2020 05:22) (102/73 - 160/63)  BP(mean): --  RR: 18 (10 Oct 2020 05:22) (18 - 18)  SpO2: 93% (10 Oct 2020 05:22) (93% - 98%)  CAPILLARY BLOOD GLUCOSE          10-09 @ 07:01  -  10-10 @ 07:00  --------------------------------------------------------  IN: 540 mL / OUT: 0 mL / NET: 540 mL        GENERAL:   NECK: supple, No JVD  CHEST/LUNG: clear to auscultation bilaterally; no rales, rhonchi, or wheezing b/l  HEART: normal S1, S2  ABDOMEN: BS+, soft, ND, NT   EXTREMITIES:  pulses palpable; no clubbing, cyanosis, or edema b/l LEs  SKIN: no rashes or lesions      LABS:                        9.0    13.65 )-----------( 353      ( 10 Oct 2020 07:24 )             29.3     10-10    141  |  105  |  27<H>  ----------------------------<  95  3.9   |  24  |  1.10    Ca    9.3      10 Oct 2020 07:24    TPro  6.7  /  Alb  4.1  /  TBili  0.3  /  DBili  x   /  AST  16  /  ALT  10  /  AlkPhos  67  10-08    PT/INR - ( 08 Oct 2020 20:25 )   PT: 12.7 sec;   INR: 1.06 ratio         PTT - ( 08 Oct 2020 20:25 )  PTT:32.9 sec           INTERVAL HPI/OVERNIGHT EVENTS:  Pt seen and examined at bedside.     Allergies/Intolerance: No Known Allergies      MEDICATIONS  (STANDING):  amLODIPine   Tablet 10 milliGRAM(s) Oral daily  ATENolol  Tablet 50 milliGRAM(s) Oral daily  atorvastatin 10 milliGRAM(s) Oral at bedtime  levothyroxine 50 MICROGram(s) Oral daily  melatonin 3 milliGRAM(s) Oral once  pantoprazole    Tablet 40 milliGRAM(s) Oral before breakfast    MEDICATIONS  (PRN):        ROS: all systems reviewed and wnl      PHYSICAL EXAMINATION:  Vital Signs Last 24 Hrs  T(C): 36.7 (10 Oct 2020 05:22), Max: 36.7 (09 Oct 2020 10:36)  T(F): 98.1 (10 Oct 2020 05:22), Max: 98.1 (09 Oct 2020 13:22)  HR: 72 (10 Oct 2020 05:22) (60 - 72)  BP: 160/63 (10 Oct 2020 05:22) (102/73 - 160/63)  BP(mean): --  RR: 18 (10 Oct 2020 05:22) (18 - 18)  SpO2: 93% (10 Oct 2020 05:22) (93% - 98%)  CAPILLARY BLOOD GLUCOSE          10-09 @ 07:01  -  10-10 @ 07:00  --------------------------------------------------------  IN: 540 mL / OUT: 0 mL / NET: 540 mL        GENERAL: stable, no new complaints, no active bleeding   NECK: supple, No JVD  CHEST/LUNG: clear to auscultation bilaterally; no rales, rhonchi, or wheezing b/l  HEART: normal S1, S2  ABDOMEN: BS+, soft, ND, NT   EXTREMITIES:  pulses palpable; no clubbing, cyanosis, or edema b/l LEs  SKIN: no rashes or lesions      LABS:                        9.0    13.65 )-----------( 353      ( 10 Oct 2020 07:24 )             29.3     10-10    141  |  105  |  27<H>  ----------------------------<  95  3.9   |  24  |  1.10    Ca    9.3      10 Oct 2020 07:24    TPro  6.7  /  Alb  4.1  /  TBili  0.3  /  DBili  x   /  AST  16  /  ALT  10  /  AlkPhos  67  10-08    PT/INR - ( 08 Oct 2020 20:25 )   PT: 12.7 sec;   INR: 1.06 ratio         PTT - ( 08 Oct 2020 20:25 )  PTT:32.9 sec

## 2020-10-10 NOTE — PROGRESS NOTE ADULT - ASSESSMENT
86 y/o female PMH HTN, HLD presenting with abdominal cramping and vaginal bleeding. Pt accompanied by her daughter who is providing additional information about the pt. The bleeding has been occurring "each month" and has been going on for a few months. The pt is a poor historian who cannot recall if her bleeding is occurring after a BM, urination or if it is menses. She states the blood is bright red and notes it in her underwear. The pt underwent menopause around age 52. 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 Pt denies any fevers, chills, HA, SOB, palpitations, diarrhea, lightheadedness, CP, urinary or bowel incontinence, hx of abdominal surgeries. Not on AC.    Plan: GI was called this AM. Stool guiac requested. CBC in AM. Regular diet. Observe for now. HGB acceptable 9.3, yesterday was 9.2.    CT notes mostly diverticulosis and endometrial thickening, no clear diverticulitis. Hold ABX for now. Add bowl regimen.  CEA blood tumor marker  will be sent for the AM.       Will call GYN to eval possible endometrial bleeding. Pelvic sonogram ordered.      CV: Continue all BP meds Atenolol 50 mg/day, Norvasc 10 mg/day and Lipitor 10 mg/day. Continue Synthyroid 50 mcg/day, TSH in AM.        Family does not want aggressive eval, favors avoiding procedures if possible given dementia. 86 y/o female PMH HTN, HLD presenting with abdominal cramping and vaginal bleeding. Pt accompanied by her daughter who is providing additional information about the pt. The bleeding has been occurring "each month" and has been going on for a few months. The pt is a poor historian who cannot recall if her bleeding is occurring after a BM, urination or if it is menses. She states the blood is bright red and notes it in her underwear. The pt underwent menopause around age 52. 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 Pt denies any fevers, chills, HA, SOB, palpitations, diarrhea, lightheadedness, CP, urinary or bowel incontinence, hx of abdominal surgeries. Not on AC.    Plan: GI was called this AM. Stool guiac requested. CBC in AM. HGB stable. Regular diet. Observe for now. HGB acceptable 9.0, yesterday was 9.2.    CT notes mostly diverticulosis and endometrial thickening, no clear diverticulitis. Hold ABX for now. Add bowl regimen.  CEA blood tumor marker  will be sent for the AM.       Will call GYN to eval possible endometrial bleeding. Pelvic sonogram ordered.  Likely needs endometrial biopsy as outpatient.     CV: Continue all BP meds Atenolol 50 mg/day, Norvasc 10 mg/day and Lipitor 10 mg/day. Continue Synthyroid 50 mcg/day, TSH in AM.        Family does not want aggressive eval, favors avoiding procedures if possible given dementia. Spoke to daughter today, eager for discharge if possible [phone].

## 2020-10-10 NOTE — CONSULT NOTE ADULT - ASSESSMENT
88 y/o female PMH HTN, HLD presenting with abdominal cramping and vaginal bleeding. Pt accompanied by her daughter who is providing additional information about the pt. The bleeding has been occurring "each month" and has been going on for a few months. The pt is a poor historian who cannot recall if her bleeding is occurring after a BM, urination or if it is menses. She states the blood is bright red and notes it in her underwear. The pt underwent menopause around age 52. 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 Pt denies any fevers, chills, HA, SOB, palpitations, diarrhea, lightheadedness, CP, urinary or bowel incontinence, hx of abdominal surgeries. Not on AC.  pt with htn on atenolol continue current med  pt may need colonoscopy, i will be fu pt for Dr nicholas  pt can be cleared cardiac wise if family agrees with colonoscopy  awaiting GYN eval  fu cbc  increase ambulation

## 2020-10-10 NOTE — DISCHARGE NOTE PROVIDER - NSDCMRMEDTOKEN_GEN_ALL_CORE_FT
amLODIPine 10 mg oral tablet: 1 tab(s) orally once a day  atenolol 50 mg oral tablet: 1 tab(s) orally once a day  atorvastatin 10 mg oral tablet: 1 tab(s) orally once a day (at bedtime)  levothyroxine 50 mcg (0.05 mg) oral tablet: 1 tab(s) orally once a day  melatonin 3 mg oral tablet: 1 tab(s) orally once  polyethylene glycol 3350 oral powder for reconstitution: 17 gram(s) orally 2 times a day  senna oral tablet: 2 tab(s) orally once a day (at bedtime)   amLODIPine 10 mg oral tablet: 1 tab(s) orally once a day  atenolol 50 mg oral tablet: 1 tab(s) orally once a day  atorvastatin 10 mg oral tablet: 1 tab(s) orally once a day (at bedtime)  ferrous sulfate 325 mg (65 mg elemental iron) oral tablet: 1 tab(s) orally once a day   levothyroxine 50 mcg (0.05 mg) oral tablet: 1 tab(s) orally once a day  melatonin 3 mg oral tablet: 1 tab(s) orally once  polyethylene glycol 3350 oral powder for reconstitution: 17 gram(s) orally 2 times a day  senna oral tablet: 2 tab(s) orally once a day (at bedtime)

## 2020-10-10 NOTE — CHART NOTE - NSCHARTNOTEFT_GEN_A_CORE
Discussed with primary team plan of care for patient - Agree with outpatient ultrasound and endometrial biopsy. Patient can follow up with GYN - faculty practice provided below. Attempted x3 to discuss plan of care with daughter over the phone (mailbox is full). If daughter would like to reach out to discuss plan of care, can call [phone].     [facility]  [address] [place] [address], [address] [address]  [phone]  Ask for first available doctor    d/w Dr. Vijay Grimes [email or web address]

## 2020-10-10 NOTE — DISCHARGE NOTE PROVIDER - NSDCFUADDAPPT_GEN_ALL_CORE_FT
[facility]  [address], [address] [address]  [phone]  Please call to schedule follow up with GYN Ask for first available doctor

## 2020-10-10 NOTE — CONSULT NOTE ADULT - SUBJECTIVE AND OBJECTIVE BOX
CHIEF COMPLAINT:Patient is a 87y old  Female who presents with a chief complaint of GI bleed (10 Oct 2020 08:10)      HPI:  88 y/o female PMH HTN, HLD presenting with abdominal cramping and vaginal bleeding. Pt accompanied by her daughter who is providing additional information about the pt. The bleeding has been occurring "each month" and has been going on for a few months. The pt is a poor historian who cannot recall if her bleeding is occurring after a BM, urination or if it is menses. She states the blood is bright red and notes it in her underwear. The pt underwent menopause around age 52. 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 Pt denies any fevers, chills, HA, SOB, palpitations, diarrhea, lightheadedness, CP, urinary or bowel incontinence, hx of abdominal surgeries. Not on AC. (09 Oct 2020 08:31)      PAST MEDICAL & SURGICAL HISTORY:  HLD (hyperlipidemia)    HTN (hypertension)        MEDICATIONS  (STANDING):  amLODIPine   Tablet 10 milliGRAM(s) Oral daily  ATENolol  Tablet 50 milliGRAM(s) Oral daily  atorvastatin 10 milliGRAM(s) Oral at bedtime  levothyroxine 50 MICROGram(s) Oral daily  melatonin 3 milliGRAM(s) Oral once  pantoprazole    Tablet 40 milliGRAM(s) Oral before breakfast  polyethylene glycol 3350 17 Gram(s) Oral two times a day  senna 2 Tablet(s) Oral at bedtime    MEDICATIONS  (PRN):      FAMILY HISTORY:      SOCIAL HISTORY:    [ ] Non-smoker  [ ] Smoker  [ ] Alcohol    Allergies    No Known Allergies    Intolerances    	    REVIEW OF SYSTEMS:  CONSTITUTIONAL: No fever, weight loss, or fatigue  EYES: No eye pain, visual disturbances, or discharge  ENT:  No difficulty hearing, tinnitus, vertigo; No sinus or throat pain  NECK: No pain or stiffness  RESPIRATORY: No cough, wheezing, chills or hemoptysis; No Shortness of Breath  CARDIOVASCULAR: No chest pain, palpitations, passing out, dizziness, or leg swelling  GASTROINTESTINAL: No abdominal or epigastric pain. No nausea, vomiting, or hematemesis; No diarrhea or constipation. No melena or hematochezia.  GENITOURINARY: No dysuria, frequency, hematuria, or incontinence  NEUROLOGICAL: No headaches, memory loss, loss of strength, numbness, or tremors  SKIN: No itching, burning, rashes, or lesions   LYMPH Nodes: No enlarged glands  ENDOCRINE: No heat or cold intolerance; No hair loss  MUSCULOSKELETAL: No joint pain or swelling; No muscle, back, or extremity pain  PSYCHIATRIC: No depression, anxiety, mood swings, or difficulty sleeping  HEME/LYMPH: No easy bruising, or bleeding gums  ALLERGY AND IMMUNOLOGIC: No hives or eczema	    [ ] All others negative	  [ ] Unable to obtain    PHYSICAL EXAM:  T(C): 36.7 (10-10-20 @ 08:57), Max: 36.7 (10-09-20 @ 10:36)  HR: 62 (10-10-20 @ 08:57) (60 - 72)  BP: 147/68 (10-10-20 @ 08:57) (102/73 - 160/63)  RR: 18 (10-10-20 @ 08:57) (18 - 18)  SpO2: 99% (10-10-20 @ 08:57) (93% - 99%)  Wt(kg): --  I&O's Summary    09 Oct 2020 07:01  -  10 Oct 2020 07:00  --------------------------------------------------------  IN: 540 mL / OUT: 0 mL / NET: 540 mL        Appearance: Normal	  HEENT:   Normal oral mucosa, PERRL, EOMI	  Lymphatic: No lymphadenopathy  Cardiovascular: Normal S1 S2, No JVD, + murmurs, No edema  Respiratory: Lungs clear to auscultation	  Psychiatry: A & O x 3, Mood & affect appropriate  Gastrointestinal:  Soft, Non-tender, + BS	  Skin: No rashes, No ecchymoses, No cyanosis	  Neurologic: Non-focal  Extremities: Normal range of motion, No clubbing, cyanosis or edema  Vascular: Peripheral pulses palpable 2+ bilaterally    TELEMETRY: 	    ECG:  	  RADIOLOGY:  OTHER: 	  	  LABS:	 	    CARDIAC MARKERS:                              9.0    13.65 )-----------( 353      ( 10 Oct 2020 07:24 )             29.3     10-10    141  |  105  |  27<H>  ----------------------------<  95  3.9   |  24  |  1.10    Ca    9.3      10 Oct 2020 07:24    TPro  6.7  /  Alb  4.1  /  TBili  0.3  /  DBili  x   /  AST  16  /  ALT  10  /  AlkPhos  67  10-08    proBNP:   Lipid Profile:   HgA1c:   TSH:   PT/INR - ( 08 Oct 2020 20:25 )   PT: 12.7 sec;   INR: 1.06 ratio         PTT - ( 08 Oct 2020 20:25 )  PTT:32.9 sec    PREVIOUS DIAGNOSTIC TESTING:      < from: CT Abdomen and Pelvis w/ IV Cont (10.08.20 @ 21:48) >  inflammatory change, which may be due to partial distention and/or muscle hypertrophy (less likely diverticulitis). Recommend clinical correlation to assess colitis. Follow-up colonoscopy would be helpful to exclude potential underlying neoplasm.    Thickened endometrium. Neoplasm is suspected in a postmenopausal patient.    Prominent bladder wall, which may be due to partial distention. Recommended correlation with urinalysis to assess urinary tract infection.    Commonbile duct dilatation. Recommend clinical correlation (LFT) and additional imaging (MRCP/MR) if there is clinical suspicion for biliary pathology.    Indeterminate hepatic lesion, which can be characterized on a nonemergent contrast-enhanced MRI.      CT + Colon diverticulosis. Prominent wall of the cecum/ascending colon junction and proximal sigmoid colon, Thickened endometrium, CBD 8mm    Biliary dilation - age appropriate and normal LFTS with benign exam and tolerating PO diet; no need for MRCP clinically    GI bleed - HD stable ?diverticular vs underlying neoplasm given CT findings  -monitor CBC and BMs  -keep active type and screen, transfuse PRN if symptomatic or Hgb <8  -add PO PPI  -d/w daughter; she will discuss with pt's  and family will decide over the weekend if they wish to pursue colonoscopy.  If they agree to have pt stay in hospital and have colonoscopy, can plan for Tuesday  -family agreeable to check CEA (tumor marker) in AM (ordered)  -ok for PO diet as tolerated    Endometrial thickening with reported vaginal bleeding in post-menopausal female   -await GYN input (called by primary team)

## 2020-10-10 NOTE — DISCHARGE NOTE PROVIDER - HOSPITAL COURSE
86 y/o female PMH HTN, HLD presenting with abdominal cramping and vaginal bleeding. Pt accompanied by her daughter who is providing additional information about the pt.   The bleeding has been occurring "each month" and has been going on for a few months. The pt is a poor historian who cannot recall if her bleeding is occurring after a BM,   urination or if it is menses. She states the blood is bright red and notes it in her underwear. The pt underwent menopause around age 52. Pt has a questionable remote hx   of a lymph node "cancer" in the 1970's. Pt denies any fevers, chills, HA, SOB, palpitations, diarrhea, lightheadedness, CP, urinary or bowel incontinence, hx of abdominal   surgeries. Not on AC.     86 y/o  with dementia,HTN,  LMP age 52, presented to the ED with bleeding of unknown origin. In ED pelvic exam performed without blood in vagina vault, crimson blood   noted on digital rectal exam. GI consulted. Patient with history suspicious for abnormal uterine bleeding over the last year. CT revealed thickened endometrial lining concerning for   neoplasm. GYN consulted. VSS. H/h stable. No bleeding on pad at time of evaluation. Recommended for TVUS to r/o malignancy. Seen by GI, recommended colonoscopy if family   agrees.    - 88 y/o  with dementia,HTN,  LMP age 52, presented to the ED with bleeding of unknown origin and abdominal cramping. 88 y/o female PMH HTN, HLD and dementia presenting with abdominal cramping and vaginal bleeding. CT + Colon diverticulosis. Prominent wall of the cecum/ascending colon junction and proximal sigmoid colon, Thickened endometrium, CBD 8mm  Biliary dilation - Seen by GI and GYN. CBD dilation is age appropriate and normal LFTS with benign exam and tolerating PO diet; no need for MRCP clinically per GI.    In ED pelvic exam performed without blood in vagina vault, crimson blood noted on digital rectal exam. GI consulted. Patient with history suspicious for abnormal uterine   bleeding over the last year. CT revealed thickened endometrial lining concerning for  neoplasm.  Recommended for TVUS to r/o malignancy.     - 86 y/o  with dementia,HTN,  LMP age 52, presented to the ED with bleeding of unknown origin and abdominal cramping. 86 y/o female PMH HTN, HLD and dementia presenting with abdominal cramping and vaginal bleeding. CT + Colon diverticulosis. Prominent wall of the cecum/ascending colon junction and proximal sigmoid colon, Thickened endometrium, CBD 8mm  Biliary dilation - Seen by GI and GYN. CBD dilation is age appropriate and normal LFTS with benign exam and tolerating PO diet; no need for MRCP clinically per GI.   In ED pelvic exam performed without blood in vagina vault, crimson blood noted on digital rectal exam. GI consulted. EGD: normal esophagus, normal duodenum, mild gastritis  (biopsied)  COLON: pan- diverticulosis, otherwise negative. Patient with history suspicious for abnormal uterine bleeding over the last year. CT revealed thickened endometrial lining concerning for neoplasm. TVUS done, GYN attempted uterine biopsy today but was unsuccessful, pt to follow up as out-pt. Pt cleared for DC home today per Dr. Whitmore.    86 y/o female PMH dementia,HTN, LMP age 52, presented to the ED with bleeding of unknown origin and abdominal cramping. CT abdomen notes Colon diverticulosis. Prominent wall of the cecum/ascending colon junction and proximal sigmoid colon, Thickened endometrium. LFTS with benign exam and tolerating PO diet; no need for MRCP clinically per GI despite some dilated CBD ducts seen on CT.      In ED pelvic exam performed without blood in vagina vault, crimson blood noted on digital rectal exam. GI consulted. EGD: normal esophagus, normal duodenum, mild gastritis  (biopsied), no source of upper GI bleeding seen. COLON: pan- diverticulosis, otherwise negative. No source of GI bleeding seen, guiac was negative.   CXR is clear, COVID-19 swab negative. HGB stable 8.2-9.0.     Patient with history suspicious for abnormal uterine bleeding over the last year. CT revealed thickened endometrial lining concerning for neoplasm. TVUS done confirmed thickening of endometral wall. GYN attempted uterine biopsy today but was unsuccessful. Patient to follow up as out-pt with GYN. See attached med list   and CT report. 88 y/o female PMH dementia,HTN, LMP age 52, presented to the ED with bleeding of unknown origin and abdominal cramping. CT abdomen notes colon diverticulosis. Prominent wall of the cecum/ascending colon junction and proximal sigmoid colon, thickened endometrium. LFTS all normal with benign exam and tolerating PO diet; no need for MRCP clinically per GI despite some dilated CBD ducts seen on CT.      In ED pelvic exam performed without blood in vagina vault, crimson blood noted on digital rectal exam. GI consulted. EGD: normal esophagus, normal duodenum, mild gastritis  (biopsied), no source of upper GI bleeding seen. COLON: pan- diverticulosis, otherwise negative. No source of GI bleeding seen, guiac was negative.   CXR is clear, COVID-19 swab negative. HGB stable 8.2-9.0.     Patient with history suspicious for abnormal uterine bleeding over the last year. CT revealed thickened endometrial lining concerning for neoplasm. TVUS done confirmed thickening of endometral wall. GYN attempted uterine biopsy today but was unsuccessful. Patient to follow up as out-pt with GYN. See attached med list   and CT report.

## 2020-10-11 LAB
HCT VFR BLD CALC: 27.5 % — LOW (ref 34.5–45)
HCT VFR BLD CALC: 27.7 % — LOW (ref 34.5–45)
HGB BLD-MCNC: 8.5 G/DL — LOW (ref 11.5–15.5)
HGB BLD-MCNC: 8.6 G/DL — LOW (ref 11.5–15.5)
MCHC RBC-ENTMCNC: 22.5 PG — LOW (ref 27–34)
MCHC RBC-ENTMCNC: 22.6 PG — LOW (ref 27–34)
MCHC RBC-ENTMCNC: 30.9 GM/DL — LOW (ref 32–36)
MCHC RBC-ENTMCNC: 31 GM/DL — LOW (ref 32–36)
MCV RBC AUTO: 72.8 FL — LOW (ref 80–100)
MCV RBC AUTO: 72.9 FL — LOW (ref 80–100)
NRBC # BLD: 0 /100 WBCS — SIGNIFICANT CHANGE UP (ref 0–0)
NRBC # BLD: 0 /100 WBCS — SIGNIFICANT CHANGE UP (ref 0–0)
OB PNL STL: NEGATIVE — SIGNIFICANT CHANGE UP
PLATELET # BLD AUTO: 327 K/UL — SIGNIFICANT CHANGE UP (ref 150–400)
PLATELET # BLD AUTO: 339 K/UL — SIGNIFICANT CHANGE UP (ref 150–400)
RBC # BLD: 3.78 M/UL — LOW (ref 3.8–5.2)
RBC # BLD: 3.8 M/UL — SIGNIFICANT CHANGE UP (ref 3.8–5.2)
RBC # FLD: 15.7 % — HIGH (ref 10.3–14.5)
RBC # FLD: 15.7 % — HIGH (ref 10.3–14.5)
WBC # BLD: 11.43 K/UL — HIGH (ref 3.8–10.5)
WBC # BLD: 14.79 K/UL — HIGH (ref 3.8–10.5)
WBC # FLD AUTO: 11.43 K/UL — HIGH (ref 3.8–10.5)
WBC # FLD AUTO: 14.79 K/UL — HIGH (ref 3.8–10.5)

## 2020-10-11 RX ORDER — LANOLIN ALCOHOL/MO/W.PET/CERES
3 CREAM (GRAM) TOPICAL ONCE
Refills: 0 | Status: COMPLETED | OUTPATIENT
Start: 2020-10-11 | End: 2020-10-11

## 2020-10-11 RX ADMIN — ATENOLOL 50 MILLIGRAM(S): 25 TABLET ORAL at 06:47

## 2020-10-11 RX ADMIN — Medication 3 MILLIGRAM(S): at 00:30

## 2020-10-11 RX ADMIN — POLYETHYLENE GLYCOL 3350 17 GRAM(S): 17 POWDER, FOR SOLUTION ORAL at 06:46

## 2020-10-11 RX ADMIN — ATORVASTATIN CALCIUM 10 MILLIGRAM(S): 80 TABLET, FILM COATED ORAL at 22:25

## 2020-10-11 RX ADMIN — POLYETHYLENE GLYCOL 3350 17 GRAM(S): 17 POWDER, FOR SOLUTION ORAL at 17:53

## 2020-10-11 RX ADMIN — PANTOPRAZOLE SODIUM 40 MILLIGRAM(S): 20 TABLET, DELAYED RELEASE ORAL at 06:47

## 2020-10-11 RX ADMIN — AMLODIPINE BESYLATE 10 MILLIGRAM(S): 2.5 TABLET ORAL at 06:47

## 2020-10-11 RX ADMIN — Medication 50 MICROGRAM(S): at 06:47

## 2020-10-11 RX ADMIN — SENNA PLUS 2 TABLET(S): 8.6 TABLET ORAL at 22:25

## 2020-10-11 NOTE — PROGRESS NOTE ADULT - SUBJECTIVE AND OBJECTIVE BOX
CARDIOLOGY     PROGRESS  NOTE   ________________________________________________    CHIEF COMPLAINT:Patient is a 87y old  Female who presents with a chief complaint of GI bleed (10 Oct 2020 13:40)  no complain.  	  REVIEW OF SYSTEMS:  CONSTITUTIONAL: No fever, weight loss, or fatigue  EYES: No eye pain, visual disturbances, or discharge  ENT:  No difficulty hearing, tinnitus, vertigo; No sinus or throat pain  NECK: No pain or stiffness  RESPIRATORY: No cough, wheezing, chills or hemoptysis; No Shortness of Breath  CARDIOVASCULAR: No chest pain, palpitations, passing out, dizziness, or leg swelling  GASTROINTESTINAL: No abdominal or epigastric pain. No nausea, vomiting, or hematemesis; No diarrhea or constipation. No melena or hematochezia.  GENITOURINARY: No dysuria, frequency, hematuria, or incontinence  NEUROLOGICAL: No headaches, memory loss, loss of strength, numbness, or tremors  SKIN: No itching, burning, rashes, or lesions   LYMPH Nodes: No enlarged glands  ENDOCRINE: No heat or cold intolerance; No hair loss  MUSCULOSKELETAL: No joint pain or swelling; No muscle, back, or extremity pain  PSYCHIATRIC: No depression, anxiety, mood swings, or difficulty sleeping  HEME/LYMPH: No easy bruising, or bleeding gums  ALLERGY AND IMMUNOLOGIC: No hives or eczema	    [ ] All others negative	  [ ] Unable to obtain    PHYSICAL EXAM:  T(C): 37.2 (10-11-20 @ 06:43), Max: 37.2 (10-11-20 @ 06:43)  HR: 68 (10-11-20 @ 06:43) (58 - 68)  BP: 110/76 (10-11-20 @ 06:43) (102/58 - 175/74)  RR: 18 (10-11-20 @ 06:43) (18 - 18)  SpO2: 99% (10-11-20 @ 06:43) (96% - 99%)  Wt(kg): --  I&O's Summary    10 Oct 2020 07:01  -  11 Oct 2020 07:00  --------------------------------------------------------  IN: 841 mL / OUT: 0 mL / NET: 841 mL    11 Oct 2020 07:01  -  11 Oct 2020 11:58  --------------------------------------------------------  IN: 240 mL / OUT: 0 mL / NET: 240 mL        Appearance: Normal	  HEENT:   Normal oral mucosa, PERRL, EOMI	  Lymphatic: No lymphadenopathy  Cardiovascular: Normal S1 S2, No JVD, + murmurs, No edema  Respiratory: Lungs clear to auscultation	  Psychiatry: A & O x 3, Mood & affect appropriate  Gastrointestinal:  Soft, Non-tender, + BS	  Skin: No rashes, No ecchymoses, No cyanosis	  Neurologic: Non-focal  Extremities: Normal range of motion, No clubbing, cyanosis or edema  Vascular: Peripheral pulses palpable 2+ bilaterally    MEDICATIONS  (STANDING):  amLODIPine   Tablet 10 milliGRAM(s) Oral daily  ATENolol  Tablet 50 milliGRAM(s) Oral daily  atorvastatin 10 milliGRAM(s) Oral at bedtime  levothyroxine 50 MICROGram(s) Oral daily  melatonin 3 milliGRAM(s) Oral once  pantoprazole    Tablet 40 milliGRAM(s) Oral before breakfast  polyethylene glycol 3350 17 Gram(s) Oral two times a day  senna 2 Tablet(s) Oral at bedtime      TELEMETRY: 	    ECG:  	  RADIOLOGY:  OTHER: 	  	  LABS:	 	    CARDIAC MARKERS:                                8.5    11.43 )-----------( 327      ( 11 Oct 2020 07:10 )             27.5     10-10    141  |  105  |  27<H>  ----------------------------<  95  3.9   |  24  |  1.10    Ca    9.3      10 Oct 2020 07:24      proBNP:   Lipid Profile:   HgA1c:   TSH: Thyroid Stimulating Hormone, Serum: 5.87 uIU/mL (10-10 @ 10:19)    Discussed with primary team plan of care for patient - Agree with outpatient ultrasound and endometrial biopsy. Patient can follow up with GYN - faculty practice provided below. Attempted x3 to discuss plan of care with daughter over the phone (mailbox is full). If daughter would like to reach out to discuss plan of care, can call [phone].     [facility]  [address], [address], [place] [address]  [phone]  Ask for first available doctor      GI bleed - HD stable ?diverticular vs underlying neoplasm given CT findings  -monitor CBC and BMs  -keep active type and screen, transfuse PRN if symptomatic or Hgb <8  -add PO PPI  -d/w daughter; she will discuss with pt's  and family will decide over the weekend if they wish to pursue colonoscopy.  If they agree to have pt stay in hospital and have colonoscopy, can plan for Tuesday  -family agreeable to check CEA (tumor marker) in AM (ordered)  -ok for PO diet as tolerated    Endometrial thickening with reported vaginal bleeding in post-menopausal female   -await GYN input (called by primary team      < from: CT Abdomen and Pelvis w/ IV Cont (10.08.20 @ 21:48) >  Colon diverticulosis. Prominent wall of the cecum/ascending colon junction and proximal sigmoidcolon without significant inflammatory change, which may be due to partial distention and/or muscle hypertrophy (less likely diverticulitis). Recommend clinical correlation to assess colitis. Follow-up colonoscopy would be helpful to exclude potential underlying neoplasm.    Thickened endometrium. Neoplasm is suspected in a postmenopausal patient.    Prominent bladder wall, which may be due to partial distention. Recommended correlation with urinalysis to assess urinary tract infection.    Commonbile duct dilatation. Recommend clinical correlation (LFT) and additional imaging (MRCP/MR) if there is clinical suspicion for biliary pathology.        )Assessment and plan  ---------------------------  88 y/o female PMH HTN, HLD presenting with abdominal cramping and vaginal bleeding. Pt accompanied by her daughter who is providing additional information about the pt. The bleeding has been occurring "each month" and has been going on for a few months. The pt is a poor historian who cannot recall if her bleeding is occurring after a BM, urination or if it is menses. She states the blood is bright red and notes it in her underwear. The pt underwent menopause around age 52. 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 Pt denies any fevers, chills, HA, SOB, palpitations, diarrhea, lightheadedness, CP, urinary or bowel incontinence, hx of abdominal surgeries. Not on AC.  pt with htn on atenolol continue current med  pt may need colonoscopy, i will be fu pt for Dr nicholas  pt can be cleared cardiac wise if family agrees with colonoscopy  awaiting GYN eval noted fu as out pt  discussed with regarding accepted  decrease hgb  fu cbc  increase ambulation  will discuss with gi

## 2020-10-12 LAB
HCT VFR BLD CALC: 27.7 % — LOW (ref 34.5–45)
HGB BLD-MCNC: 8.8 G/DL — LOW (ref 11.5–15.5)
MCHC RBC-ENTMCNC: 23.1 PG — LOW (ref 27–34)
MCHC RBC-ENTMCNC: 31.8 GM/DL — LOW (ref 32–36)
MCV RBC AUTO: 72.7 FL — LOW (ref 80–100)
NRBC # BLD: 0 /100 WBCS — SIGNIFICANT CHANGE UP (ref 0–0)
PLATELET # BLD AUTO: 342 K/UL — SIGNIFICANT CHANGE UP (ref 150–400)
RBC # BLD: 3.81 M/UL — SIGNIFICANT CHANGE UP (ref 3.8–5.2)
RBC # FLD: 15.7 % — HIGH (ref 10.3–14.5)
WBC # BLD: 10.6 K/UL — HIGH (ref 3.8–10.5)
WBC # FLD AUTO: 10.6 K/UL — HIGH (ref 3.8–10.5)

## 2020-10-12 PROCEDURE — 76830 TRANSVAGINAL US NON-OB: CPT | Mod: 26

## 2020-10-12 PROCEDURE — 71045 X-RAY EXAM CHEST 1 VIEW: CPT | Mod: 26

## 2020-10-12 PROCEDURE — 99233 SBSQ HOSP IP/OBS HIGH 50: CPT

## 2020-10-12 PROCEDURE — 93975 VASCULAR STUDY: CPT | Mod: 26

## 2020-10-12 PROCEDURE — 76856 US EXAM PELVIC COMPLETE: CPT | Mod: 26,59

## 2020-10-12 RX ORDER — SOD SULF/SODIUM/NAHCO3/KCL/PEG
1000 SOLUTION, RECONSTITUTED, ORAL ORAL EVERY 4 HOURS
Refills: 0 | Status: COMPLETED | OUTPATIENT
Start: 2020-10-12 | End: 2020-10-12

## 2020-10-12 RX ADMIN — AMLODIPINE BESYLATE 10 MILLIGRAM(S): 2.5 TABLET ORAL at 05:48

## 2020-10-12 RX ADMIN — SENNA PLUS 2 TABLET(S): 8.6 TABLET ORAL at 21:32

## 2020-10-12 RX ADMIN — Medication 1000 MILLILITER(S): at 20:05

## 2020-10-12 RX ADMIN — ATENOLOL 50 MILLIGRAM(S): 25 TABLET ORAL at 05:48

## 2020-10-12 RX ADMIN — Medication 50 MICROGRAM(S): at 05:48

## 2020-10-12 RX ADMIN — Medication 1000 MILLILITER(S): at 15:59

## 2020-10-12 RX ADMIN — Medication 3 MILLIGRAM(S): at 21:32

## 2020-10-12 RX ADMIN — ATORVASTATIN CALCIUM 10 MILLIGRAM(S): 80 TABLET, FILM COATED ORAL at 21:32

## 2020-10-12 RX ADMIN — Medication 20 MILLIGRAM(S): at 16:00

## 2020-10-12 RX ADMIN — PANTOPRAZOLE SODIUM 40 MILLIGRAM(S): 20 TABLET, DELAYED RELEASE ORAL at 05:48

## 2020-10-12 RX ADMIN — POLYETHYLENE GLYCOL 3350 17 GRAM(S): 17 POWDER, FOR SOLUTION ORAL at 05:48

## 2020-10-12 NOTE — PROGRESS NOTE ADULT - ASSESSMENT
88 y/o female    PMH HTN, HLD    presenting with abdominal cramping and vaginal bleeding.   CT ,   diverticulosis and endometrial thickening  anemia/  seen by gi and  gyn    gyn  has  recommended  out  pt w/p     on  Atenolol / Norvasc / Lipitor /  synthrpid     Family does not want aggressive eval, favors avoiding procedures if possible given dementia./daughter, / [phone].  wbc  of  14,000/  do  not suspect infectious  etiology    ID eval    suspect pt ha s gyn malignancy 86 y/o female    PMH HTN, HLD    presenting with abdominal cramping and vaginal bleeding.   CT ,   diverticulosis and endometrial thickening  anemia/  seen by gi and  gyn    gyn  has  recommended  out  pt w/p     on  Atenolol / Norvasc / Lipitor /  synthrpid     Family does not want aggressive eval, favors avoiding procedures if possible given dementia./daughter, / [phone].  wbc  of  14,000/  do  not suspect infectious  etiology    ID eval  dr elvis hanna   suspect pt ha s gyn malignancy   gi  w/p in [place]

## 2020-10-12 NOTE — PROGRESS NOTE ADULT - ASSESSMENT
88 y/o female PMH HTN, HLD and dementia presenting with abdominal cramping and vaginal bleeding    CT + Colon diverticulosis. Prominent wall of the cecum/ascending colon junction and proximal sigmoid colon, Thickened endometrium, CBD 8mm  Red/Maroon stool on PARUL 10/9; FOBT negative 10/11    Biliary dilation - age appropriate and normal LFTS with benign exam and tolerating PO diet; no need for MRCP clinically    GI bleed - HD stable ?diverticular vs underlying neoplasm given CT findings  -monitor CBC and BMs  -keep active type and screen, transfuse PRN if symptomatic or Hgb <8  -PO PPI  -d/w daughter; family has discussed and wish for pt to have endoscopic evaluation as inpt.  -bowel prep and NPO after MN for EGD and Colonoscopy in AM    Endometrial thickening with reported vaginal bleeding in post-menopausal female   -await pelvic US  -GYN w/u in progress    Discussed with Medicine and Cardiology attendings  Discussed with pt. and daughter at bedside, all questions answered    Andrew Issa PA-C    [facility]  [phone]  After hours and weekend coverage [phone]

## 2020-10-12 NOTE — DIETITIAN INITIAL EVALUATION ADULT. - OTHER INFO
Visited pt at bedside. Pt reports having a good appetite both PTA and in-house; consuming >75% of most meals. Tolerating CLD well. Pt denies any known food allergies or intolerances. Pt denies any chewing/swallowing difficulty, self-feeding difficulty, nausea/vomiting, constipation, or diarrhea. Pt Multivitamin supplementation at home.     Pt and family unable to recall pt's UBW. Pt states that dosing weight of 99lbs is an old weight of "when I was sick", but is unable to recall whether or not she had any weight changes. Will continue to monitor.     Education: Provided low-fiber nutrition therapy including importance of avoiding  fiber rich foods, fresh fruits/vegetables, whole grains, nuts, beans, and seeds as well as added fiber in processed foods. Reviewed sources. Discussed chewing foods well and adequate hydration. Pt and family verbalized understanding and accepted written handout. Encouraged balanced meals with adequate protein for healing; sources discussed.

## 2020-10-12 NOTE — CHART NOTE - TREATMENT: THE FOLLOWING DIET HAS BEEN RECOMMENDED
Diet, NPO after Midnight:      NPO Start Date: 12-Oct-2020,   NPO Start Time: 23:59 (10-12-20 @ 10:12) [Active]  Diet, Clear Liquid (10-12-20 @ 09:37) [Active]

## 2020-10-12 NOTE — CONSULT NOTE ADULT - ASSESSMENT
87y female with PMH significant for HTN, HLD & dementia, some neck cancer in the 1992, was brought in by family for evaluation of abdominal cramping and vaginal bleeding.    They suspect that she must have been having vaginal bleed for several months and says that she has been having her periods with cramps.   She told her  on 10/07/20 that she had big bleed from mensis & brought in on 10/08/20.   Here she was afebrile with WBC of 9K.   CT imaging revealed colon diverticulosis, thickened endometrium, neoplasm is suspected in a postmenopausal pt.   Seen by GI & colonoscopy recommended.   Seen by Gyn & pelvic ultrasound & endometrial biopsy recommended.   Her WBC increased from 9.77 to 14.7 & Hgb dropped from 9.3 to 8.6 - ? dilutional ? reactive to drop in H & H & possibly suspected malignancy.   Today her WBC has self declined to 10.60 - not typical for infectious etiology of leukocytosis    PLAN:  Check blood cx to be complete.   No indication for empiric antibiotics.   Thank you, will follow

## 2020-10-12 NOTE — DIETITIAN INITIAL EVALUATION ADULT. - PHYSICAL APPEARANCE
underweight/Pt declined Nutrition-focused physical exam at this time./other (specify) Ht: 63in, Dosing Wt: 99lbs, BMI: 17.5kg/m2, IBW: 115lbs +/- 10%  Edema: 1+ b/l ankles  Skin per nursing flowsheets: no pressure injury documented

## 2020-10-12 NOTE — PROGRESS NOTE ADULT - SUBJECTIVE AND OBJECTIVE BOX
afebrile    REVIEW OF SYSTEMS:  GEN: no fever,    no chills  RESP: no SOB,   no cough  CVS: no chest pain,   no palpitations  GI: no abdominal pain,   no nausea,   no vomiting,   no constipation,   no diarrhea  : no dysuria,   no frequency  NEURO: no headache,   no dizziness  PSYCH: no depression,   not anxious  Derm : no rash    MEDICATIONS  (STANDING):  amLODIPine   Tablet 10 milliGRAM(s) Oral daily  ATENolol  Tablet 50 milliGRAM(s) Oral daily  atorvastatin 10 milliGRAM(s) Oral at bedtime  levothyroxine 50 MICROGram(s) Oral daily  melatonin 3 milliGRAM(s) Oral once  pantoprazole    Tablet 40 milliGRAM(s) Oral before breakfast  polyethylene glycol 3350 17 Gram(s) Oral two times a day  senna 2 Tablet(s) Oral at bedtime    MEDICATIONS  (PRN):      Vital Signs Last 24 Hrs  T(C): 36.4 (12 Oct 2020 05:21), Max: 37.2 (11 Oct 2020 06:43)  T(F): 97.5 (12 Oct 2020 05:21), Max: 98.9 (11 Oct 2020 06:43)  HR: 60 (12 Oct 2020 05:21) (58 - 68)  BP: 153/70 (12 Oct 2020 05:21) (101/61 - 153/70)  BP(mean): --  RR: 18 (12 Oct 2020 05:21) (18 - 18)  SpO2: 93% (12 Oct 2020 05:21) (93% - 99%)  CAPILLARY BLOOD GLUCOSE        I&O's Summary    10 Oct 2020 07:01  -  11 Oct 2020 07:00  --------------------------------------------------------  IN: 841 mL / OUT: 0 mL / NET: 841 mL    11 Oct 2020 07:01  -  12 Oct 2020 06:42  --------------------------------------------------------  IN: 1180 mL / OUT: 0 mL / NET: 1180 mL        PHYSICAL EXAM:  HEAD:  Atraumatic, Normocephalic  NECK: Supple, No   JVD  CHEST/LUNG:   no     rales,     no,    rhonchi  HEART: Regular rate and rhythm;         murmur  ABDOMEN: Soft, Nontender, ;   EXTREMITIES:   no     edema  NEUROLOGY:  alert    LABS:                        8.6    14.79 )-----------( 339      ( 11 Oct 2020 17:20 )             27.7     10-10    141  |  105  |  27<H>  ----------------------------<  95  3.9   |  24  |  1.10    Ca    9.3      10 Oct 2020 07:24                      Thyroid Stimulating Hormone, Serum: 5.87 uIU/mL (10-10 @ 10:19)          Consultant(s) Notes Reviewed:      Care Discussed with Consultants/Other Providers:

## 2020-10-12 NOTE — DIETITIAN INITIAL EVALUATION ADULT. - PERTINENT MEDS FT
MEDICATIONS  (STANDING):  amLODIPine   Tablet 10 milliGRAM(s) Oral daily  ATENolol  Tablet 50 milliGRAM(s) Oral daily  atorvastatin 10 milliGRAM(s) Oral at bedtime  levothyroxine 50 MICROGram(s) Oral daily  melatonin 3 milliGRAM(s) Oral once  pantoprazole    Tablet 40 milliGRAM(s) Oral before breakfast  polyethylene glycol 3350 17 Gram(s) Oral two times a day  polyethylene glycol/electrolyte Solution 1000 milliLiter(s) Oral every 4 hours  senna 2 Tablet(s) Oral at bedtime    MEDICATIONS  (PRN):

## 2020-10-12 NOTE — DIETITIAN INITIAL EVALUATION ADULT. - ADD RECOMMEND
1) Medical team to advance diet when medically feasible, consider advancing to low fiber diet as tolerated. 2) Reinforce diet education as needed; RD remains available. 3) Encourage PO intake, obtain food preferences, provide feeding assistance as needed. 4) Underweight sticker placed - spoke to provider.

## 2020-10-12 NOTE — CONSULT NOTE ADULT - SUBJECTIVE AND OBJECTIVE BOX
HPI:   Patient is a 87y female with PMH significant for HTN, HLD & dementia, some neck cancerin the 1992 that was resected & treated with RT, was brought in by family for evaluation of abdominal cramping and vaginal bleeding.    History is provided by daughter, who reports that patient is noncompliant with doctor visits & has been reporting about abdominal cramps for months, and although she did not report they suspect that she must have been having vaginal bleed for a long time and she now says that she has been having her periods. Patient is a poor historian who cannot recall. for how long has she been having vaginal bleed, thinks it is 2017 or 2019 & it is spring.    She told her  on 10/07/20 that she had big bleed from mensis & her  called the daughter next day. She was brought in on 10/08/20 but daughter reports it was very very difficult for the entire family to bring her & she did not want to see a doctor. Here she was afebrile with WBC of 9K. CT imaging revealed colon diverticulosis, thickened endometrium, neoplasm is suspected in a postmenopausal pt. Seen by GI & colonoscopy recommended. Seen by Gyn & pelvic ultrasound & endometrial biopsy recommended. Her WBC increased from 9.77 to 14.7 & Hgb dropped from 9.3 to 8.6. ID called to evaluate for leukocytosis.     REVIEW OF SYSTEMS:  All other review of systems negative (Comprehensive ROS). Pt is not oriented to time, but pleasant & says that my periods dont really bother me.      PAST MEDICAL & SURGICAL HISTORY:  HLD (hyperlipidemia)  HTN (hypertension)  Dementia  Neck cancer in 1992    Allergies  No Known Allergies  Intolerances      Antimicrobials Day #  : none    Other Medications:  amLODIPine   Tablet 10 milliGRAM(s) Oral daily  ATENolol  Tablet 50 milliGRAM(s) Oral daily  atorvastatin 10 milliGRAM(s) Oral at bedtime  levothyroxine 50 MICROGram(s) Oral daily  melatonin 3 milliGRAM(s) Oral once  pantoprazole    Tablet 40 milliGRAM(s) Oral before breakfast  polyethylene glycol 3350 17 Gram(s) Oral two times a day  senna 2 Tablet(s) Oral at bedtime      FAMILY HISTORY:  Mother had dementia     SOCIAL HISTORY:  Smoking: former smoker     ETOH: occasional    Drug Use: none        T(F): 97.5 (10-12-20 @ 05:21), Max: 98.4 (10-11-20 @ 13:25)  HR: 60 (10-12-20 @ 05:21)  BP: 153/70 (10-12-20 @ 05:21)  RR: 18 (10-12-20 @ 05:21)  SpO2: 93% (10-12-20 @ 05:21)  Wt(kg): --    PHYSICAL EXAM:  General: alert, no acute distress  Eyes:  anicteric, no conjunctival injection, no discharge  Oropharynx: no lesions or injection 	  Neck: supple, without adenopathy  Lungs: clear to auscultation  Heart: regular rate and rhythm; no murmurs  Abdomen: softly distended, nontender, without mass or organomegaly, bowel sounds +  Skin: no lesions  Extremities: no clubbing, cyanosis, or edema  Neurologic: alert, oriented, moves all extremities    LAB RESULTS:                        8.8    10.60 )-----------( 342      ( 12 Oct 2020 06:47 )             27.7                 MICROBIOLOGY:  RECENT CULTURES:        RADIOLOGY REVIEWED:

## 2020-10-12 NOTE — PROGRESS NOTE ADULT - SUBJECTIVE AND OBJECTIVE BOX
CARDIOLOGY     PROGRESS  NOTE   ________________________________________________    CHIEF COMPLAINT:Patient is a 87y old  Female who presents with a chief complaint of GI bleed (12 Oct 2020 08:27)  no complain.  	  REVIEW OF SYSTEMS:  CONSTITUTIONAL: No fever, weight loss, or fatigue  EYES: No eye pain, visual disturbances, or discharge  ENT:  No difficulty hearing, tinnitus, vertigo; No sinus or throat pain  NECK: No pain or stiffness  RESPIRATORY: No cough, wheezing, chills or hemoptysis; No Shortness of Breath  CARDIOVASCULAR: No chest pain, palpitations, passing out, dizziness, or leg swelling  GASTROINTESTINAL: No abdominal or epigastric pain. No nausea, vomiting, or hematemesis; No diarrhea or constipation. No melena or hematochezia.  GENITOURINARY: No dysuria, frequency, hematuria, or incontinence  NEUROLOGICAL: No headaches, memory loss, loss of strength, numbness, or tremors  SKIN: No itching, burning, rashes, or lesions   LYMPH Nodes: No enlarged glands  ENDOCRINE: No heat or cold intolerance; No hair loss  MUSCULOSKELETAL: No joint pain or swelling; No muscle, back, or extremity pain  PSYCHIATRIC: No depression, anxiety, mood swings, or difficulty sleeping  HEME/LYMPH: No easy bruising, or bleeding gums  ALLERGY AND IMMUNOLOGIC: No hives or eczema	    [ ] All others negative	  [ ] Unable to obtain    PHYSICAL EXAM:  T(C): 36.4 (10-12-20 @ 09:30), Max: 36.9 (10-11-20 @ 13:25)  HR: 61 (10-12-20 @ 09:30) (58 - 61)  BP: 100/48 (10-12-20 @ 09:30) (100/48 - 153/70)  RR: 18 (10-12-20 @ 09:30) (18 - 18)  SpO2: 99% (10-12-20 @ 09:30) (93% - 99%)  Wt(kg): --  I&O's Summary    11 Oct 2020 07:01  -  12 Oct 2020 07:00  --------------------------------------------------------  IN: 1180 mL / OUT: 0 mL / NET: 1180 mL        Appearance: Normal	  HEENT:   Normal oral mucosa, PERRL, EOMI	  Lymphatic: No lymphadenopathy  Cardiovascular: Normal S1 S2, No JVD, + murmurs, No edema  Respiratory: Lungs clear to auscultation	  Psychiatry: A & O x 3, Mood & affect appropriate  Gastrointestinal:  Soft, Non-tender, + BS	  Skin: No rashes, No ecchymoses, No cyanosis	  Neurologic: Non-focal  Extremities: Normal range of motion, No clubbing, cyanosis or edema  Vascular: Peripheral pulses palpable 2+ bilaterally    MEDICATIONS  (STANDING):  amLODIPine   Tablet 10 milliGRAM(s) Oral daily  ATENolol  Tablet 50 milliGRAM(s) Oral daily  atorvastatin 10 milliGRAM(s) Oral at bedtime  levothyroxine 50 MICROGram(s) Oral daily  melatonin 3 milliGRAM(s) Oral once  pantoprazole    Tablet 40 milliGRAM(s) Oral before breakfast  polyethylene glycol 3350 17 Gram(s) Oral two times a day  senna 2 Tablet(s) Oral at bedtime      TELEMETRY: 	    ECG:  	  RADIOLOGY:  OTHER: 	  	  LABS:	 	    CARDIAC MARKERS:                                8.8    10.60 )-----------( 342      ( 12 Oct 2020 06:47 )             27.7           proBNP:   Lipid Profile:   HgA1c:   TSH: Thyroid Stimulating Hormone, Serum: 5.87 uIU/mL (10-10 @ 10:19)          Assessment and plan  ---------------------------  86 y/o female PMH HTN, HLD presenting with abdominal cramping and vaginal bleeding. Pt accompanied by her daughter who is providing additional information about the pt. The bleeding has been occurring "each month" and has been going on for a few months. The pt is a poor historian who cannot recall if her bleeding is occurring after a BM, urination or if it is menses. She states the blood is bright red and notes it in her underwear. The pt underwent menopause around age 52. 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 Pt denies any fevers, chills, HA, SOB, palpitations, diarrhea, lightheadedness, CP, urinary or bowel incontinence, hx of abdominal surgeries. Not on AC.  pt with htn on atenolol continue current med  pt may need colonoscopy, i will be fu pt for Dr nicholas  pt can be cleared cardiac wise if family agrees with colonoscopy  awaiting GYN eval noted fu as out pt  discussed with regarding accepted  decrease hgb  fu cbc  increase ambulation  pt is clear cardiac wise for gi procedure tomiorrow

## 2020-10-12 NOTE — PROGRESS NOTE ADULT - SUBJECTIVE AND OBJECTIVE BOX
Patient is a 87y old  Female who presented with a chief complaint of GI bleed (12 Oct 2020 09:38)      INTERVAL HPI/OVERNIGHT EVENTS:  had episodes of rectal +/- vaginal bleeding over weekend  family has decided to have pt undergo endoscopic evaluation as inpt as pt has refused to go to outpt. doctors visits on many occasions    no abdominal pain, cramping  tolerating PO   no fever or chills  no CP or SOB    MEDICATIONS  (STANDING):  amLODIPine   Tablet 10 milliGRAM(s) Oral daily  ATENolol  Tablet 50 milliGRAM(s) Oral daily  atorvastatin 10 milliGRAM(s) Oral at bedtime  levothyroxine 50 MICROGram(s) Oral daily  melatonin 3 milliGRAM(s) Oral once  pantoprazole    Tablet 40 milliGRAM(s) Oral before breakfast  polyethylene glycol 3350 17 Gram(s) Oral two times a day  senna 2 Tablet(s) Oral at bedtime      Allergies  No Known Allergies      Review of Systems:  General:  No wt loss, fevers, chills, night sweats,fatigue  CV:  No pain, palpitations, hypo/hypertension  Resp:  No dyspnea, cough, tachypnea, wheezing  GI:  see HPI  :  No pain, bleeding, incontinence, nocturia  Muscle:  No pain, weakness  Neuro:  No focal weakness, tingling, +dementia  Psych:  No fatigue, insomnia, mood problems, depression  Endocrine:  No polyuria, polydypsia, cold/heat intolerance  Heme:  No petechiae, ecchymosis, easy bruisability  Skin:  No rash, tattoos, scars, edema      Vital Signs Last 24 Hrs  T(C): 36.4 (12 Oct 2020 09:30), Max: 36.9 (11 Oct 2020 13:25)  T(F): 97.5 (12 Oct 2020 09:30), Max: 98.4 (11 Oct 2020 13:25)  HR: 61 (12 Oct 2020 09:30) (58 - 61)  BP: 100/48 (12 Oct 2020 09:30) (100/48 - 153/70)  BP(mean): --  RR: 18 (12 Oct 2020 09:30) (18 - 18)  SpO2: 99% (12 Oct 2020 09:30) (93% - 99%)    PHYSICAL EXAM:  Constitutional: NAD, well-developed pleasantly confused elderly female. daughter at bedside  Neck: No LAD, supple  Respiratory: Clear b/l  Cardiovascular: S1 and S2, Regular  Gastrointestinal: BS+, soft, NT/ND, neg HSM,  Rectal: normal tone +maroon colored stool, no melena. no palpable lesions  Extremities: No peripheral edema, neg clubbing, cyanosis  Vascular: 2+ peripheral pulses  Neurological: alert and appropriate but forgetful; pleasantly confused. no focal asymmetry  Psychiatric: Normal mood, normal affect  Skin: No rashes      LABS:                        8.8    10.60 )-----------( 342      ( 12 Oct 2020 06:47 )             27.7     Hemoglobin: 8.6 g/dL (10.11.20 @ 17:20)   Hemoglobin: 8.5 g/dL (10.11.20 @ 07:10)   Hemoglobin: 9.0 g/dL (10.10.20 @ 07:24)   Hemoglobin: 9.3 g/dL (10.09.20 @ 00:10)   Hemoglobin: 9.2 g/dL (10.08.20 @ 20:25)     Occult Blood, Feces (10.11.20 @ 15:01)   Occult Blood, Feces: Negative     Carcinoembryonic Antigen (10.10.20 @ 10:19)   Carcinoembryonic Antigen: 3.2    Thyroid Stimulating Hormone, Serum in AM (10.10.20 @ 10:19)   Thyroid Stimulating Hormone, Serum: 5.87 uIU/mL       RADIOLOGY & ADDITIONAL TESTS:   Patient is a 87y old  Female who presented with a chief complaint of GI bleed    INTERVAL HPI/OVERNIGHT EVENTS:  had episodes of rectal +/- vaginal bleeding over weekend  family has decided to have pt undergo endoscopic evaluation as inpt as pt has refused to go to outpt. doctors visits on many occasions    no abdominal pain, cramping  tolerating PO   no fever or chills  no CP or SOB    MEDICATIONS  (STANDING):  amLODIPine   Tablet 10 milliGRAM(s) Oral daily  ATENolol  Tablet 50 milliGRAM(s) Oral daily  atorvastatin 10 milliGRAM(s) Oral at bedtime  levothyroxine 50 MICROGram(s) Oral daily  melatonin 3 milliGRAM(s) Oral once  pantoprazole    Tablet 40 milliGRAM(s) Oral before breakfast  polyethylene glycol 3350 17 Gram(s) Oral two times a day  senna 2 Tablet(s) Oral at bedtime      Allergies  No Known Allergies      Review of Systems:  General:  No wt loss, fevers, chills, night sweats,fatigue  CV:  No pain, palpitations, hypo/hypertension  Resp:  No dyspnea, cough, tachypnea, wheezing  GI:  see HPI  :  No pain, bleeding, incontinence, nocturia  Muscle:  No pain, weakness  Neuro:  No focal weakness, tingling, +dementia  Psych:  No fatigue, insomnia, mood problems, depression  Endocrine:  No polyuria, polydypsia, cold/heat intolerance  Heme:  No petechiae, ecchymosis, easy bruisability  Skin:  No rash, tattoos, scars, edema      Vital Signs Last 24 Hrs  T(C): 36.4 (12 Oct 2020 09:30), Max: 36.9 (11 Oct 2020 13:25)  T(F): 97.5 (12 Oct 2020 09:30), Max: 98.4 (11 Oct 2020 13:25)  HR: 61 (12 Oct 2020 09:30) (58 - 61)  BP: 100/48 (12 Oct 2020 09:30) (100/48 - 153/70)  BP(mean): --  RR: 18 (12 Oct 2020 09:30) (18 - 18)  SpO2: 99% (12 Oct 2020 09:30) (93% - 99%)    PHYSICAL EXAM:  Constitutional: NAD, well-developed pleasantly confused elderly female. daughter at bedside  Neck: No LAD, supple  Respiratory: Clear b/l  Cardiovascular: S1 and S2, Regular  Gastrointestinal: BS+, soft, NT/ND, neg HSM,  Rectal: normal tone +maroon colored stool, no melena. no palpable lesions  Extremities: No peripheral edema, neg clubbing, cyanosis  Vascular: 2+ peripheral pulses  Neurological: alert and appropriate but forgetful; pleasantly confused. no focal asymmetry  Psychiatric: Normal mood, normal affect  Skin: No rashes      LABS:                        8.8    10.60 )-----------( 342      ( 12 Oct 2020 06:47 )             27.7     Hemoglobin: 8.6 g/dL (10.11.20 @ 17:20)   Hemoglobin: 8.5 g/dL (10.11.20 @ 07:10)   Hemoglobin: 9.0 g/dL (10.10.20 @ 07:24)   Hemoglobin: 9.3 g/dL (10.09.20 @ 00:10)   Hemoglobin: 9.2 g/dL (10.08.20 @ 20:25)     Occult Blood, Feces (10.11.20 @ 15:01)   Occult Blood, Feces: Negative     Carcinoembryonic Antigen (10.10.20 @ 10:19)   Carcinoembryonic Antigen: 3.2    Thyroid Stimulating Hormone, Serum in AM (10.10.20 @ 10:19)   Thyroid Stimulating Hormone, Serum: 5.87 uIU/mL       RADIOLOGY & ADDITIONAL TESTS:

## 2020-10-12 NOTE — DIETITIAN INITIAL EVALUATION ADULT. - REASON INDICATOR FOR ASSESSMENT
Pt seen for length of stay.   Source: EMR, pt, and pt's family at bedside.   Pertinent chart information: 88 y/o female PMH HTN, HLD and dementia presenting with abdominal cramping and vaginal bleeding

## 2020-10-13 ENCOUNTER — RESULT REVIEW (OUTPATIENT)
Age: 85
End: 2020-10-13

## 2020-10-13 LAB
HCT VFR BLD CALC: 27.9 % — LOW (ref 34.5–45)
HGB BLD-MCNC: 8.7 G/DL — LOW (ref 11.5–15.5)
MCHC RBC-ENTMCNC: 22.8 PG — LOW (ref 27–34)
MCHC RBC-ENTMCNC: 31.2 GM/DL — LOW (ref 32–36)
MCV RBC AUTO: 73 FL — LOW (ref 80–100)
NRBC # BLD: 0 /100 WBCS — SIGNIFICANT CHANGE UP (ref 0–0)
PLATELET # BLD AUTO: 350 K/UL — SIGNIFICANT CHANGE UP (ref 150–400)
RBC # BLD: 3.82 M/UL — SIGNIFICANT CHANGE UP (ref 3.8–5.2)
RBC # FLD: 16 % — HIGH (ref 10.3–14.5)
WBC # BLD: 10.77 K/UL — HIGH (ref 3.8–10.5)
WBC # FLD AUTO: 10.77 K/UL — HIGH (ref 3.8–10.5)

## 2020-10-13 PROCEDURE — 88312 SPECIAL STAINS GROUP 1: CPT | Mod: 26

## 2020-10-13 PROCEDURE — 88305 TISSUE EXAM BY PATHOLOGIST: CPT | Mod: 26

## 2020-10-13 RX ORDER — SODIUM CHLORIDE 9 MG/ML
1000 INJECTION INTRAMUSCULAR; INTRAVENOUS; SUBCUTANEOUS
Refills: 0 | Status: DISCONTINUED | OUTPATIENT
Start: 2020-10-13 | End: 2020-10-14

## 2020-10-13 RX ADMIN — SENNA PLUS 2 TABLET(S): 8.6 TABLET ORAL at 21:42

## 2020-10-13 RX ADMIN — ATORVASTATIN CALCIUM 10 MILLIGRAM(S): 80 TABLET, FILM COATED ORAL at 21:41

## 2020-10-13 RX ADMIN — ATENOLOL 50 MILLIGRAM(S): 25 TABLET ORAL at 05:34

## 2020-10-13 RX ADMIN — POLYETHYLENE GLYCOL 3350 17 GRAM(S): 17 POWDER, FOR SOLUTION ORAL at 17:09

## 2020-10-13 RX ADMIN — SODIUM CHLORIDE 30 MILLILITER(S): 9 INJECTION INTRAMUSCULAR; INTRAVENOUS; SUBCUTANEOUS at 08:51

## 2020-10-13 RX ADMIN — PANTOPRAZOLE SODIUM 40 MILLIGRAM(S): 20 TABLET, DELAYED RELEASE ORAL at 12:41

## 2020-10-13 RX ADMIN — Medication 50 MICROGRAM(S): at 05:34

## 2020-10-13 RX ADMIN — AMLODIPINE BESYLATE 10 MILLIGRAM(S): 2.5 TABLET ORAL at 05:34

## 2020-10-13 NOTE — PRE-ANESTHESIA EVALUATION ADULT - NSANTHSUBSTSD_GEN_ALL_CORE
Clinical Pharmacy - Warfarin Dosing Consult     Pharmacy has been consulted to manage this patient s warfarin therapy.  Indication: Atrial Fibrillation  Therapy Goal: INR 2-3  Provider/Team: Dr. Key  Warfarin Prior to Admission: Yes  Warfarin PTA Regimen: 2.5 mg on Mon and Fri, 5 mg all other days  Significant drug interactions: none  Dose Comments: took dose on 3/30    INR   Date Value Ref Range Status   03/31/2017 2.50 (H) 0.86 - 1.14 Final   03/30/2017 2.58 (H) 0.86 - 1.14 Final       Recommend warfarin 5 mg today.  Pharmacy will monitor Melvin Abad daily and order warfarin doses to achieve specified goal.      Please contact pharmacy as soon as possible if the warfarin needs to be held for a procedure or if the warfarin goals change.       No

## 2020-10-13 NOTE — PROGRESS NOTE ADULT - SUBJECTIVE AND OBJECTIVE BOX
no  rectal  bleed    REVIEW OF SYSTEMS:  GEN: no fever,    no chills  RESP: no SOB,   no cough  CVS: no chest pain,   no palpitations  GI: no abdominal pain,   no nausea,   no vomiting,   no constipation,   no diarrhea  : no dysuria,   no frequency  NEURO: no headache,   no dizziness  PSYCH: no depression,   not anxious  Derm : no rash    MEDICATIONS  (STANDING):  amLODIPine   Tablet 10 milliGRAM(s) Oral daily  ATENolol  Tablet 50 milliGRAM(s) Oral daily  atorvastatin 10 milliGRAM(s) Oral at bedtime  levothyroxine 50 MICROGram(s) Oral daily  pantoprazole    Tablet 40 milliGRAM(s) Oral before breakfast  polyethylene glycol 3350 17 Gram(s) Oral two times a day  senna 2 Tablet(s) Oral at bedtime    MEDICATIONS  (PRN):      Vital Signs Last 24 Hrs  T(C): 36.5 (13 Oct 2020 05:06), Max: 36.8 (12 Oct 2020 14:12)  T(F): 97.7 (13 Oct 2020 05:06), Max: 98.3 (12 Oct 2020 14:12)  HR: 69 (13 Oct 2020 05:06) (60 - 69)  BP: 127/64 (13 Oct 2020 05:06) (100/48 - 164/64)  BP(mean): --  RR: 18 (13 Oct 2020 05:06) (18 - 18)  SpO2: 95% (13 Oct 2020 05:06) (95% - 100%)  CAPILLARY BLOOD GLUCOSE        I&O's Summary    11 Oct 2020 07:01  -  12 Oct 2020 07:00  --------------------------------------------------------  IN: 1180 mL / OUT: 0 mL / NET: 1180 mL    12 Oct 2020 07:01  -  13 Oct 2020 06:36  --------------------------------------------------------  IN: 2000 mL / OUT: 0 mL / NET: 2000 mL        PHYSICAL EXAM:  HEAD:  Atraumatic, Normocephalic  NECK: Supple, No   JVD  CHEST/LUNG:   no     rales,     no,    rhonchi  HEART: Regular rate and rhythm;         murmur  ABDOMEN: Soft, Nontender, ;   EXTREMITIES:    no    edema  NEUROLOGY:  alert    LABS:                        8.8    10.60 )-----------( 342      ( 12 Oct 2020 06:47 )             27.7                           Thyroid Stimulating Hormone, Serum: 5.87 uIU/mL (10-10 @ 10:19)          Consultant(s) Notes Reviewed:      Care Discussed with Consultants/Other Providers:

## 2020-10-13 NOTE — PROGRESS NOTE ADULT - SUBJECTIVE AND OBJECTIVE BOX
CARDIOLOGY     PROGRESS  NOTE   ________________________________________________    CHIEF COMPLAINT:Patient is a 87y old  Female who presents with a chief complaint of GI bleed (13 Oct 2020 06:35)  no complain.  	  REVIEW OF SYSTEMS:  CONSTITUTIONAL: No fever, weight loss, or fatigue  EYES: No eye pain, visual disturbances, or discharge  ENT:  No difficulty hearing, tinnitus, vertigo; No sinus or throat pain  NECK: No pain or stiffness  RESPIRATORY: No cough, wheezing, chills or hemoptysis; No Shortness of Breath  CARDIOVASCULAR: No chest pain, palpitations, passing out, dizziness, or leg swelling  GASTROINTESTINAL: No abdominal or epigastric pain. No nausea, vomiting, or hematemesis; No diarrhea or constipation. No melena or hematochezia.  GENITOURINARY: No dysuria, frequency, hematuria, or incontinence  NEUROLOGICAL: No headaches, memory loss, loss of strength, numbness, or tremors  SKIN: No itching, burning, rashes, or lesions   LYMPH Nodes: No enlarged glands  ENDOCRINE: No heat or cold intolerance; No hair loss  MUSCULOSKELETAL: No joint pain or swelling; No muscle, back, or extremity pain  PSYCHIATRIC: No depression, anxiety, mood swings, or difficulty sleeping  HEME/LYMPH: No easy bruising, or bleeding gums  ALLERGY AND IMMUNOLOGIC: No hives or eczema	    [ ] All others negative	  [ ] Unable to obtain    PHYSICAL EXAM:  T(C): 36.7 (10-13-20 @ 08:47), Max: 36.8 (10-12-20 @ 14:12)  HR: 69 (10-13-20 @ 08:47) (60 - 82)  BP: 133/94 (10-13-20 @ 08:47) (100/48 - 164/64)  RR: 16 (10-13-20 @ 08:47) (16 - 18)  SpO2: 99% (10-13-20 @ 08:47) (95% - 100%)  Wt(kg): --  I&O's Summary    12 Oct 2020 07:01  -  13 Oct 2020 07:00  --------------------------------------------------------  IN: 2000 mL / OUT: 0 mL / NET: 2000 mL    13 Oct 2020 07:01  -  13 Oct 2020 09:28  --------------------------------------------------------  IN: 30 mL / OUT: 0 mL / NET: 30 mL        Appearance: Normal	  HEENT:   Normal oral mucosa, PERRL, EOMI	  Lymphatic: No lymphadenopathy  Cardiovascular: Normal S1 S2, No JVD, + murmurs, No edema  Respiratory: Lungs clear to auscultation	  Psychiatry: A & O x 3, Mood & affect appropriate  Gastrointestinal:  Soft, Non-tender, + BS	  Skin: No rashes, No ecchymoses, No cyanosis	  Neurologic: Non-focal  Extremities: Normal range of motion, No clubbing, cyanosis or edema  Vascular: Peripheral pulses palpable 2+ bilaterally    MEDICATIONS  (STANDING):  amLODIPine   Tablet 10 milliGRAM(s) Oral daily  ATENolol  Tablet 50 milliGRAM(s) Oral daily  atorvastatin 10 milliGRAM(s) Oral at bedtime  levothyroxine 50 MICROGram(s) Oral daily  pantoprazole    Tablet 40 milliGRAM(s) Oral before breakfast  polyethylene glycol 3350 17 Gram(s) Oral two times a day  senna 2 Tablet(s) Oral at bedtime  sodium chloride 0.9%. 1000 milliLiter(s) (30 mL/Hr) IV Continuous <Continuous>      TELEMETRY: 	    ECG:  	  RADIOLOGY:  OTHER: 	  	  LABS:	 	    CARDIAC MARKERS:                                8.7    10.77 )-----------( 350      ( 13 Oct 2020 07:27 )             27.9           proBNP:   Lipid Profile:   HgA1c:   TSH: Thyroid Stimulating Hormone, Serum: 5.87 uIU/mL (10-10 @ 10:19)          Assessment and plan  ---------------------------  88 y/o female PMH HTN, HLD presenting with abdominal cramping and vaginal bleeding. Pt accompanied by her daughter who is providing additional information about the pt. The bleeding has been occurring "each month" and has been going on for a few months. The pt is a poor historian who cannot recall if her bleeding is occurring after a BM, urination or if it is menses. She states the blood is bright red and notes it in her underwear. The pt underwent menopause around age 52. 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 Pt denies any fevers, chills, HA, SOB, palpitations, diarrhea, lightheadedness, CP, urinary or bowel incontinence, hx of abdominal surgeries. Not on AC.  pt with htn on atenolol continue current med  pt may need colonoscopy, i will be fu pt for Dr nicholas  pt can be cleared cardiac wise if family agrees with colonoscopy  awaiting GYN brit noted fu as out pt  decrease hgb  fu cbc  increase ambulation  s/p egd/colonoscopy  pt needs cardiac work up as out pt, discussed with daughter who are well known to me.  continue current bp meds

## 2020-10-13 NOTE — PRE PROCEDURE NOTE - PRE PROCEDURE EVALUATION
Pre-Endoscopy Evaluation      Referring Physician:  Antolin Rico M.D.                          Procedure:  EGD/Colonoscopy    Indication for Procedure:  GIB    Pertinent History:      Sedation by Anesthesia [x]    PAST MEDICAL & SURGICAL HISTORY:  HLD (hyperlipidemia)    HTN (hypertension)    PMH of Gastroparesis [ ]  Gastric Surgery [ ]  Gastric Outlet Obstruction [ ]  None [x]    Allergies  No Known Allergies    Latex allergy: [ ] yes [x] no    Medications:MEDICATIONS  (STANDING):  amLODIPine   Tablet 10 milliGRAM(s) Oral daily  ATENolol  Tablet 50 milliGRAM(s) Oral daily  atorvastatin 10 milliGRAM(s) Oral at bedtime  levothyroxine 50 MICROGram(s) Oral daily  pantoprazole    Tablet 40 milliGRAM(s) Oral before breakfast  polyethylene glycol 3350 17 Gram(s) Oral two times a day  senna 2 Tablet(s) Oral at bedtime    MEDICATIONS  (PRN):    Smoking: [ ] yes  [x] no    AICD/PPM: [ ] yes   [x] no    Pertinent lab data:                        8.7    10.77 )-----------( 350      ( 13 Oct 2020 07:27 )             27.9     Physical Examination:  Daily     Daily Weight in k.1 (12 Oct 2020 16:46)  Vital Signs Last 24 Hrs  T(C): 36.6 (13 Oct 2020 08:26), Max: 36.8 (12 Oct 2020 14:12)  T(F): 97.9 (13 Oct 2020 08:26), Max: 98.3 (12 Oct 2020 14:12)  HR: 82 (13 Oct 2020 08:20) (60 - 82)  BP: 144/64 (13 Oct 2020 08:26) (100/48 - 164/64)  BP(mean): --  RR: 17 (13 Oct 2020 08:20) (17 - 18)  SpO2: 98% (13 Oct 2020 08:26) (95% - 100%)    Constitutional: NAD    HEENT: PERRLA, EOMI,       Neck:  No JVD    Respiratory: CTAB/L    Cardiovascular: S1 and S2    Gastrointestinal: BS+, soft, NT/ND    Extremities: No peripheral edema    Neurological: A/O x 3,     : No Landa    Skin: No rashes    Comments:    ASA Class: I [ ]  II [ ]  III [x]  IV [ ]

## 2020-10-13 NOTE — PROGRESS NOTE ADULT - ASSESSMENT
86 y/o female    PMH HTN, HLD    presenting with abdominal cramping and vaginal bleeding.   CT ,   diverticulosis and endometrial thickening  anemia/  seen by gi and  gyn    gyn  has  recommended  out  pt w/p     on  Atenolol / Norvasc / Lipitor /  synthrpid     Family does not want aggressive eval, favors avoiding procedures if possible given dementia./daughter, / [phone].  wbc  of  14,000/  do  not suspect infectious  etiology    ID eval  dr elvis hanna /  follow  blood   c/s  pelvic  U/S.  with   endometrial mass/ probable  cancer/  needs  endometrial  biopsy/ seen by  gyn  GI  w/p  today     a< from: US Transvaginal (10.12.20 @ 14:30) >  IMPRESSION:  Heterogeneous thickened endometrial mass with tiny and small cystic components and arterial low resistant vascularity highly suspicious for endometrial carcinoma.  Alternatively, this could represent a large endometrial polyp with concerning vascularity. Sonohysterogram and/or endometrial biopsy should be performed  < end of copied text >

## 2020-10-13 NOTE — PRE PROCEDURE NOTE - ATTENDING COMMENTS
Antolin Rico MD, FACP, FACG, AGAF  [facility]  [phone]     After hours and weekend coverage GI service : [phone]

## 2020-10-14 ENCOUNTER — TRANSCRIPTION ENCOUNTER (OUTPATIENT)
Age: 85
End: 2020-10-14

## 2020-10-14 VITALS
WEIGHT: 101.85 LBS | SYSTOLIC BLOOD PRESSURE: 126 MMHG | OXYGEN SATURATION: 97 % | DIASTOLIC BLOOD PRESSURE: 68 MMHG | TEMPERATURE: 98 F | HEART RATE: 69 BPM | RESPIRATION RATE: 18 BRPM

## 2020-10-14 LAB
ANION GAP SERPL CALC-SCNC: 7 MMOL/L — SIGNIFICANT CHANGE UP (ref 5–17)
BUN SERPL-MCNC: 22 MG/DL — SIGNIFICANT CHANGE UP (ref 7–23)
CALCIUM SERPL-MCNC: 9 MG/DL — SIGNIFICANT CHANGE UP (ref 8.4–10.5)
CHLORIDE SERPL-SCNC: 108 MMOL/L — SIGNIFICANT CHANGE UP (ref 96–108)
CO2 SERPL-SCNC: 24 MMOL/L — SIGNIFICANT CHANGE UP (ref 22–31)
CREAT SERPL-MCNC: 0.91 MG/DL — SIGNIFICANT CHANGE UP (ref 0.5–1.3)
GLUCOSE SERPL-MCNC: 89 MG/DL — SIGNIFICANT CHANGE UP (ref 70–99)
HCT VFR BLD CALC: 27.6 % — LOW (ref 34.5–45)
HGB BLD-MCNC: 8.1 G/DL — LOW (ref 11.5–15.5)
MCHC RBC-ENTMCNC: 21.8 PG — LOW (ref 27–34)
MCHC RBC-ENTMCNC: 29.3 GM/DL — LOW (ref 32–36)
MCV RBC AUTO: 74.2 FL — LOW (ref 80–100)
NRBC # BLD: 0 /100 WBCS — SIGNIFICANT CHANGE UP (ref 0–0)
PLATELET # BLD AUTO: 317 K/UL — SIGNIFICANT CHANGE UP (ref 150–400)
POTASSIUM SERPL-MCNC: 3.9 MMOL/L — SIGNIFICANT CHANGE UP (ref 3.5–5.3)
POTASSIUM SERPL-SCNC: 3.9 MMOL/L — SIGNIFICANT CHANGE UP (ref 3.5–5.3)
RBC # BLD: 3.72 M/UL — LOW (ref 3.8–5.2)
RBC # FLD: 16.1 % — HIGH (ref 10.3–14.5)
SODIUM SERPL-SCNC: 139 MMOL/L — SIGNIFICANT CHANGE UP (ref 135–145)
WBC # BLD: 11.45 K/UL — HIGH (ref 3.8–10.5)
WBC # FLD AUTO: 11.45 K/UL — HIGH (ref 3.8–10.5)

## 2020-10-14 PROCEDURE — 86769 SARS-COV-2 COVID-19 ANTIBODY: CPT

## 2020-10-14 PROCEDURE — 88305 TISSUE EXAM BY PATHOLOGIST: CPT

## 2020-10-14 PROCEDURE — 86900 BLOOD TYPING SEROLOGIC ABO: CPT

## 2020-10-14 PROCEDURE — 82378 CARCINOEMBRYONIC ANTIGEN: CPT

## 2020-10-14 PROCEDURE — 85025 COMPLETE CBC W/AUTO DIFF WBC: CPT

## 2020-10-14 PROCEDURE — 84443 ASSAY THYROID STIM HORMONE: CPT

## 2020-10-14 PROCEDURE — 85730 THROMBOPLASTIN TIME PARTIAL: CPT

## 2020-10-14 PROCEDURE — 96374 THER/PROPH/DIAG INJ IV PUSH: CPT

## 2020-10-14 PROCEDURE — 80048 BASIC METABOLIC PNL TOTAL CA: CPT

## 2020-10-14 PROCEDURE — 74177 CT ABD & PELVIS W/CONTRAST: CPT

## 2020-10-14 PROCEDURE — 85027 COMPLETE CBC AUTOMATED: CPT

## 2020-10-14 PROCEDURE — 93975 VASCULAR STUDY: CPT

## 2020-10-14 PROCEDURE — 85610 PROTHROMBIN TIME: CPT

## 2020-10-14 PROCEDURE — 87040 BLOOD CULTURE FOR BACTERIA: CPT

## 2020-10-14 PROCEDURE — 80053 COMPREHEN METABOLIC PANEL: CPT

## 2020-10-14 PROCEDURE — 76830 TRANSVAGINAL US NON-OB: CPT

## 2020-10-14 PROCEDURE — 71045 X-RAY EXAM CHEST 1 VIEW: CPT

## 2020-10-14 PROCEDURE — 88312 SPECIAL STAINS GROUP 1: CPT

## 2020-10-14 PROCEDURE — 87635 SARS-COV-2 COVID-19 AMP PRB: CPT

## 2020-10-14 PROCEDURE — 99232 SBSQ HOSP IP/OBS MODERATE 35: CPT

## 2020-10-14 PROCEDURE — 82272 OCCULT BLD FECES 1-3 TESTS: CPT

## 2020-10-14 PROCEDURE — 99285 EMERGENCY DEPT VISIT HI MDM: CPT | Mod: 25

## 2020-10-14 PROCEDURE — 86901 BLOOD TYPING SEROLOGIC RH(D): CPT

## 2020-10-14 PROCEDURE — 76856 US EXAM PELVIC COMPLETE: CPT

## 2020-10-14 PROCEDURE — 86850 RBC ANTIBODY SCREEN: CPT

## 2020-10-14 RX ORDER — ACETAMINOPHEN 500 MG
650 TABLET ORAL ONCE
Refills: 0 | Status: DISCONTINUED | OUTPATIENT
Start: 2020-10-14 | End: 2020-10-14

## 2020-10-14 RX ORDER — FERROUS SULFATE 325(65) MG
325 TABLET ORAL DAILY
Refills: 0 | Status: DISCONTINUED | OUTPATIENT
Start: 2020-10-14 | End: 2020-10-14

## 2020-10-14 RX ORDER — FERROUS SULFATE 325(65) MG
1 TABLET ORAL
Qty: 30 | Refills: 0
Start: 2020-10-14 | End: 2020-11-12

## 2020-10-14 RX ORDER — ACETAMINOPHEN 500 MG
650 TABLET ORAL ONCE
Refills: 0 | Status: COMPLETED | OUTPATIENT
Start: 2020-10-14 | End: 2020-10-14

## 2020-10-14 RX ADMIN — PANTOPRAZOLE SODIUM 40 MILLIGRAM(S): 20 TABLET, DELAYED RELEASE ORAL at 05:59

## 2020-10-14 RX ADMIN — AMLODIPINE BESYLATE 10 MILLIGRAM(S): 2.5 TABLET ORAL at 05:59

## 2020-10-14 RX ADMIN — Medication 50 MICROGRAM(S): at 05:59

## 2020-10-14 RX ADMIN — Medication 325 MILLIGRAM(S): at 11:16

## 2020-10-14 RX ADMIN — Medication 650 MILLIGRAM(S): at 14:32

## 2020-10-14 RX ADMIN — POLYETHYLENE GLYCOL 3350 17 GRAM(S): 17 POWDER, FOR SOLUTION ORAL at 05:59

## 2020-10-14 RX ADMIN — ATENOLOL 50 MILLIGRAM(S): 25 TABLET ORAL at 08:30

## 2020-10-14 NOTE — PROGRESS NOTE ADULT - SUBJECTIVE AND OBJECTIVE BOX
INTERVAL HPI/OVERNIGHT EVENTS:  Pt seen and examined at bedside.     Allergies/Intolerance: No Known Allergies      MEDICATIONS  (STANDING):  amLODIPine   Tablet 10 milliGRAM(s) Oral daily  ATENolol  Tablet 50 milliGRAM(s) Oral daily  atorvastatin 10 milliGRAM(s) Oral at bedtime  levothyroxine 50 MICROGram(s) Oral daily  pantoprazole    Tablet 40 milliGRAM(s) Oral before breakfast  polyethylene glycol 3350 17 Gram(s) Oral two times a day  senna 2 Tablet(s) Oral at bedtime  sodium chloride 0.9%. 1000 milliLiter(s) (30 mL/Hr) IV Continuous <Continuous>    MEDICATIONS  (PRN):        ROS: all systems reviewed and wnl      PHYSICAL EXAMINATION:  Vital Signs Last 24 Hrs  T(C): 36.6 (14 Oct 2020 05:55), Max: 37.1 (13 Oct 2020 13:54)  T(F): 97.9 (14 Oct 2020 05:55), Max: 98.7 (13 Oct 2020 13:54)  HR: 57 (14 Oct 2020 05:55) (57 - 82)  BP: 123/63 (14 Oct 2020 05:55) (99/57 - 152/78)  BP(mean): --  RR: 18 (14 Oct 2020 05:55) (16 - 24)  SpO2: 99% (14 Oct 2020 05:55) (96% - 100%)  CAPILLARY BLOOD GLUCOSE          10-12 @ 07:01  -  10-13 @ 07:00  --------------------------------------------------------  IN: 2000 mL / OUT: 0 mL / NET: 2000 mL    10-13 @ 07:01  -  10-14 @ 06:45  --------------------------------------------------------  IN: 590 mL / OUT: 0 mL / NET: 590 mL        GENERAL:   NECK: supple, No JVD  CHEST/LUNG: clear to auscultation bilaterally; no rales, rhonchi, or wheezing b/l  HEART: normal S1, S2  ABDOMEN: BS+, soft, ND, NT   EXTREMITIES:  pulses palpable; no clubbing, cyanosis, or edema b/l LEs  SKIN: no rashes or lesions      LABS:                        8.7    10.77 )-----------( 350      ( 13 Oct 2020 07:27 )             27.9                      INTERVAL HPI/OVERNIGHT EVENTS:  Pt seen and examined at bedside.     Allergies/Intolerance: No Known Allergies      MEDICATIONS  (STANDING):  amLODIPine   Tablet 10 milliGRAM(s) Oral daily  ATENolol  Tablet 50 milliGRAM(s) Oral daily  atorvastatin 10 milliGRAM(s) Oral at bedtime  levothyroxine 50 MICROGram(s) Oral daily  pantoprazole    Tablet 40 milliGRAM(s) Oral before breakfast  polyethylene glycol 3350 17 Gram(s) Oral two times a day  senna 2 Tablet(s) Oral at bedtime  sodium chloride 0.9%. 1000 milliLiter(s) (30 mL/Hr) IV Continuous <Continuous>    MEDICATIONS  (PRN):        ROS: all systems reviewed and wnl      PHYSICAL EXAMINATION:  Vital Signs Last 24 Hrs  T(C): 36.6 (14 Oct 2020 05:55), Max: 37.1 (13 Oct 2020 13:54)  T(F): 97.9 (14 Oct 2020 05:55), Max: 98.7 (13 Oct 2020 13:54)  HR: 57 (14 Oct 2020 05:55) (57 - 82)  BP: 123/63 (14 Oct 2020 05:55) (99/57 - 152/78)  BP(mean): --  RR: 18 (14 Oct 2020 05:55) (16 - 24)  SpO2: 99% (14 Oct 2020 05:55) (96% - 100%)  CAPILLARY BLOOD GLUCOSE          10-12 @ 07:01  -  10-13 @ 07:00  --------------------------------------------------------  IN: 2000 mL / OUT: 0 mL / NET: 2000 mL    10-13 @ 07:01  -  10-14 @ 06:45  --------------------------------------------------------  IN: 590 mL / OUT: 0 mL / NET: 590 mL        GENERAL: stable in bed, asleep, no fevers or SOB, daughter at bedside.   NECK: supple, No JVD  CHEST/LUNG: clear to auscultation bilaterally; no rales, rhonchi, or wheezing b/l  HEART: normal S1, S2  ABDOMEN: BS+, soft, ND, NT   EXTREMITIES:  pulses palpable; no clubbing, cyanosis, or edema b/l LEs  SKIN: no rashes or lesions      LABS:                        8.7    10.77 )-----------( 350      ( 13 Oct 2020 07:27 )             27.9

## 2020-10-14 NOTE — PROGRESS NOTE ADULT - SUBJECTIVE AND OBJECTIVE BOX
CC: f/u for leukocytosis    Patient reports: she is sleepy, awakened with daughter at bedside,no complaints    REVIEW OF SYSTEMS:  All other review of systems negative (Comprehensive ROS)    Antimicrobials Day #  :off    Other Medications Reviewed    T(F): 97.8 (10-14-20 @ 08:28), Max: 98.7 (10-13-20 @ 13:54)  HR: 69 (10-14-20 @ 08:28)  BP: 126/68 (10-14-20 @ 08:28)  RR: 18 (10-14-20 @ 08:28)  SpO2: 97% (10-14-20 @ 08:28)  Wt(kg): --    PHYSICAL EXAM:  General: alert, no acute distress  Eyes:  anicteric, no conjunctival injection, no discharge  Oropharynx: no lesions or injection 	  Neck: supple, without adenopathy  Lungs: clear to auscultation  Heart: regular rate and rhythm; no murmur, rubs or gallops  Abdomen: soft, nondistended, nontender, without mass or organomegaly  Skin: no lesions  Extremities: no clubbing, cyanosis, or edema  Neurologic: alert, pleasantly confused, moves all extremities    LAB RESULTS:                        8.1    11.45 )-----------( 317      ( 14 Oct 2020 07:16 )             27.6     10-14    139  |  108  |  22  ----------------------------<  89  3.9   |  24  |  0.91    Ca    9.0      14 Oct 2020 07:16          MICROBIOLOGY:  RECENT CULTURES:  10-12 @ 17:38 .Blood Blood     No growth to date.          RADIOLOGY REVIEWED:    < from: US Transvaginal (10.12.20 @ 14:30) >  IMPRESSION:    Heterogeneous thickened endometrial mass with tiny and small cystic components and arterial low resistant vascularity highly suspicious for endometrial carcinoma.  Alternatively, this could represent a large endometrial polyp with concerning vascularity. Sonohysterogram and/or endometrial biopsy should be performed.    < end of copied text >  ad< from: CT Abdomen and Pelvis w/ IV Cont (10.08.20 @ 21:48) >  IMPRESSION:    Colon diverticulosis. Prominent wall of the cecum/ascending colon junction and proximal sigmoidcolon without significant inflammatory change, which may be due to partial distention and/or muscle hypertrophy (less likely diverticulitis). Recommend clinical correlation to assess colitis. Follow-up colonoscopy would be helpful to exclude potential underlying neoplasm.    Thickened endometrium. Neoplasm is suspected in a postmenopausal patient.    Prominent bladder wall, which may be due to partial distention. Recommended correlation with urinalysis to assess urinary tract infection.    Commonbile duct dilatation. Recommend clinical correlation (LFT) and additional imaging (MRCP/MR) if there is clinical suspicion for biliary pathology.    < end of copied text >

## 2020-10-14 NOTE — DISCHARGE NOTE NURSING/CASE MANAGEMENT/SOCIAL WORK - NSDCFUADDAPPT_GEN_ALL_CORE_FT
[facility]  [address], [address] [address]  [phone]  Please call to schedule follow up with GYN Ask for first available doctor

## 2020-10-14 NOTE — PROGRESS NOTE ADULT - SUBJECTIVE AND OBJECTIVE BOX
CARDIOLOGY     PROGRESS  NOTE   ________________________________________________    CHIEF COMPLAINT:Patient is a 87y old  Female who presents with a chief complaint of GI bleed (14 Oct 2020 06:45)  doing well.  	  REVIEW OF SYSTEMS:  CONSTITUTIONAL: No fever, weight loss, or fatigue  EYES: No eye pain, visual disturbances, or discharge  ENT:  No difficulty hearing, tinnitus, vertigo; No sinus or throat pain  NECK: No pain or stiffness  RESPIRATORY: No cough, wheezing, chills or hemoptysis; No Shortness of Breath  CARDIOVASCULAR: No chest pain, palpitations, passing out, dizziness, or leg swelling  GASTROINTESTINAL: No abdominal or epigastric pain. No nausea, vomiting, or hematemesis; No diarrhea or constipation. No melena or hematochezia.  GENITOURINARY: No dysuria, frequency, hematuria, or incontinence  NEUROLOGICAL: No headaches, memory loss, loss of strength, numbness, or tremors  SKIN: No itching, burning, rashes, or lesions   LYMPH Nodes: No enlarged glands  ENDOCRINE: No heat or cold intolerance; No hair loss  MUSCULOSKELETAL: No joint pain or swelling; No muscle, back, or extremity pain  PSYCHIATRIC: No depression, anxiety, mood swings, or difficulty sleeping  HEME/LYMPH: No easy bruising, or bleeding gums  ALLERGY AND IMMUNOLOGIC: No hives or eczema	    [ ] All others negative	  [ ] Unable to obtain    PHYSICAL EXAM:  T(C): 36.6 (10-14-20 @ 08:28), Max: 37.1 (10-13-20 @ 13:54)  HR: 69 (10-14-20 @ 08:28) (57 - 74)  BP: 126/68 (10-14-20 @ 08:28) (99/57 - 152/78)  RR: 18 (10-14-20 @ 08:28) (16 - 24)  SpO2: 97% (10-14-20 @ 08:28) (96% - 100%)  Wt(kg): --  I&O's Summary    13 Oct 2020 07:01  -  14 Oct 2020 07:00  --------------------------------------------------------  IN: 590 mL / OUT: 0 mL / NET: 590 mL        Appearance: Normal	  HEENT:   Normal oral mucosa, PERRL, EOMI	  Lymphatic: No lymphadenopathy  Cardiovascular: Normal S1 S2, No JVD, + murmurs, No edema  Respiratory: Lungs clear to auscultation	  Psychiatry: A & O x 3, Mood & affect appropriate  Gastrointestinal:  Soft, Non-tender, + BS	  Skin: No rashes, No ecchymoses, No cyanosis	  Neurologic: Non-focal  Extremities: Normal range of motion, No clubbing, cyanosis or edema  Vascular: Peripheral pulses palpable 2+ bilaterally    MEDICATIONS  (STANDING):  amLODIPine   Tablet 10 milliGRAM(s) Oral daily  ATENolol  Tablet 50 milliGRAM(s) Oral daily  atorvastatin 10 milliGRAM(s) Oral at bedtime  ferrous    sulfate 325 milliGRAM(s) Oral daily  levothyroxine 50 MICROGram(s) Oral daily  polyethylene glycol 3350 17 Gram(s) Oral two times a day  senna 2 Tablet(s) Oral at bedtime      TELEMETRY: 	    ECG:  	  RADIOLOGY:  OTHER: 	  	  LABS:	 	    CARDIAC MARKERS:                                8.1    11.45 )-----------( 317      ( 14 Oct 2020 07:16 )             27.6     10-14    139  |  108  |  22  ----------------------------<  89  3.9   |  24  |  0.91    Ca    9.0      14 Oct 2020 07:16      proBNP:   Lipid Profile:   HgA1c:   TSH: Thyroid Stimulating Hormone, Serum: 5.87 uIU/mL (10-10 @ 10:19)          Assessment and plan  ---------------------------  88 y/o female PMH HTN, HLD presenting with abdominal cramping and vaginal bleeding. Pt accompanied by her daughter who is providing additional information about the pt. The bleeding has been occurring "each month" and has been going on for a few months. The pt is a poor historian who cannot recall if her bleeding is occurring after a BM, urination or if it is menses. She states the blood is bright red and notes it in her underwear. The pt underwent menopause around age 52. 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 Pt denies any fevers, chills, HA, SOB, palpitations, diarrhea, lightheadedness, CP, urinary or bowel incontinence, hx of abdominal surgeries. Not on AC.  pt with htn on atenolol continue current med  pt may need colonoscopy, i will be fu pt for Dr nicholas  pt can be cleared cardiac wise if family agrees with colonoscopy  awaiting GYN eval noted fu as out pt  decrease hgb  fu cbc  increase ambulation  s/p egd / colonoscopy results noted  possible endometrial CA, needs cardiac work up prior to hysterectomy if needed

## 2020-10-14 NOTE — PROGRESS NOTE ADULT - NUTRITIONAL ASSESSMENT
This patient has been assessed with a concern for Malnutrition and has been determined to have a diagnosis/diagnoses of Underweight/BMI < 19.    This patient is being managed with:   Diet NPO after Midnight-     NPO Start Date: 12-Oct-2020   NPO Start Time: 23:59  Entered: Oct 12 2020 10:12AM    Diet Clear Liquid-  Entered: Oct 12 2020  9:37AM    
This patient has been assessed with a concern for Malnutrition and has been determined to have a diagnosis/diagnoses of Underweight/BMI < 19.    This patient is being managed with:   Diet NPO after Midnight-     NPO Start Date: 12-Oct-2020   NPO Start Time: 23:59  Entered: Oct 12 2020 10:12AM    Diet Clear Liquid-  Entered: Oct 12 2020  9:37AM    
This patient has been assessed with a concern for Malnutrition and has been determined to have a diagnosis/diagnoses of Underweight/BMI < 19.    This patient is being managed with:   Diet Regular-  Entered: Oct 13 2020 11:41AM    

## 2020-10-14 NOTE — PROGRESS NOTE ADULT - ASSESSMENT
88 y/o female PMH HTN, HLD and dementia presenting with abdominal cramping and vaginal bleeding    CT + Colon diverticulosis. Prominent wall of the cecum/ascending colon junction and proximal sigmoid colon, Thickened endometrium, CBD 8mm  Red/Maroon stool on PARUL 10/9; FOBT negative 10/11  EGD: normal esophagus, normal duodenum, mild gastritis  (biopsied)  COLON: pan- diverticulosis, otherwise negative    Biliary dilation - age appropriate and normal LFTS with benign exam and tolerating PO diet; no need for MRCP clinically    GI bleed - HD stable suspect self limited diverticular bleed with no evidence of ongoing GI bleed  -monitor CBC and BMs  -agree with PO FeSO4; goal Hgb >8  -Continue Miralax and Senna to avoid constipation  -follow up biopsy results (outpt ok)  -PO PPI  -PO diet as tolerated  -d/w daughter at bedside      Endometrial thickening with reported vaginal bleeding in post-menopausal female   -GYN follow-up    Discussed with Medicine and Cardiology attendings  Discussed with pt. and daughter at bedside, all questions answered  NO GI objection to discharge, can follow up pathology results as outpt.    Andrew Issa PA-C    [facility]  [phone]  After hours and weekend coverage [phone]

## 2020-10-14 NOTE — PROGRESS NOTE ADULT - ASSESSMENT
86 y/o female PMH HTN, HLD presenting with abdominal cramping and vaginal bleeding. Pt accompanied by her daughter who is providing additional information about the pt. The bleeding has been occurring "each month" and has been going on for a few months. The pt is a poor historian who cannot recall if her bleeding is occurring after a BM, urination or if it is menses. She states the blood is bright red and notes it in her underwear. The pt underwent menopause around age 52. 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 Pt denies any fevers, chills, HA, SOB, palpitations, diarrhea, lightheadedness, CP, urinary or bowel incontinence, hx of abdominal surgeries. Not on AC.    Plan: GI was called this AM. Stool guiac requested. CBC in AM. HGB stable. Regular diet. Observe for now. HGB acceptable 9.0, yesterday was 9.2.    CT notes mostly diverticulosis and endometrial thickening, no clear diverticulitis. Hold ABX for now. Add bowl regimen.  CEA blood tumor marker  will be sent for the AM.       Will call GYN to eval possible endometrial bleeding. Pelvic sonogram ordered.  Likely needs endometrial biopsy as outpatient.     CV: Continue all BP meds Atenolol 50 mg/day, Norvasc 10 mg/day and Lipitor 10 mg/day. Continue Synthyroid 50 mcg/day, TSH in AM.        Family does not want aggressive eval, favors avoiding procedures if possible given dementia. Spoke to daughter today, eager for discharge if possible [phone]. 86 y/o female PMH HTN, HLD presenting with abdominal cramping and vaginal bleeding. Pt accompanied by her daughter who is providing additional information about the pt. The bleeding has been occurring "each month" and has been going on for a few months. The pt is a poor historian who cannot recall if her bleeding is occurring after a BM, urination or if it is menses. She states the blood is bright red and notes it in her underwear. The pt underwent menopause around age 52. 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 Pt denies any fevers, chills, HA, SOB, palpitations, diarrhea, lightheadedness, CP, urinary or bowel incontinence, hx of abdominal surgeries. Not on AC.    Plan: GI was called. Stool guiac negative. HGB low but stable at 8.8. Regular diet. Observe for now. HGB acceptable 9.0, yesterday was 9.2.    CT notes mostly diverticulosis and endometrial thickening, no clear diverticulitis. Hold ABX for now. Add bowl regimen.  CEA blood tumor marker  will be sent for the AM.   EGD and colonoscopy no GI source of bleeding. Increase bowl regimen, diverticulosis is only findings on GI eval.     Asked  GYN to eval possible endometrial bleeding. Pelvic sonogram done, results reviewed. Likely needs endometrial biopsy as outpatient.     CV: Continue all BP meds Atenolol 50 mg/day, Norvasc 10 mg/day and Lipitor 10 mg/day. Continue Synthyroid 50 mcg/day, TSH in AM.        Family does not want aggressive eval, favors avoiding procedures if possible given dementia. Spoke to daughter today, eager for discharge if possible [phone]. 86 y/o female PMH HTN, HLD presenting with abdominal cramping and vaginal bleeding. Pt accompanied by her daughter who is providing additional information about the pt. The bleeding has been occurring "each month" and has been going on for a few months. The pt is a poor historian who cannot recall if her bleeding is occurring after a BM, urination or if it is menses. She states the blood is bright red and notes it in her underwear. The pt underwent menopause around age 52. 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 Pt denies any fevers, chills, HA, SOB, palpitations, diarrhea, lightheadedness, CP, urinary or bowel incontinence, hx of abdominal surgeries. Not on AC.    Plan: GI was called. Stool guiac negative. HGB low but stable at 8.8. Regular diet. Observe for now. HGB acceptable 9.0, yesterday was 9.2.  CT notes mostly diverticulosis and endometrial thickening, no clear diverticulitis. Hold ABX for now. Add bowl regimen.  CEA blood tumor marker will be sent for the AM.   EGD and colonoscopy no GI source of bleeding. Increase bowl regimen, diverticulosis is only findings on GI eval.  Add daily iron.     Asked  GYN to eval possible endometrial bleeding. Pelvic sonogram done, results reviewed. Likely needs endometrial biopsy as outpatient.     CV: Continue all BP meds Atenolol 50 mg/day, Norvasc 10 mg/day and Lipitor 10 mg/day. Continue Synthyroid 50 mcg/day, TSH in AM.        Family does not want aggressive eval, favors avoiding procedures if possible given dementia. Spoke to daughter today, eager for discharge if possible [phone].   Daughter agrees with outpatient GYN eval.     Discharge home today.

## 2020-10-14 NOTE — PROGRESS NOTE ADULT - ASSESSMENT
87y female with PMH significant for HTN, HLD & dementia, some neck cancer in the 1992, was brought in by family for evaluation of abdominal cramping and vaginal bleeding.    They suspect that she must have been having vaginal bleed for several months and says that she has been having her periods with cramps.   She told her  on 10/07/20 that she had big bleed from mensis & brought in on 10/08/20.   Here she was afebrile with WBC of 9K.   CT imaging revealed colon diverticulosis, thickened endometrium, neoplasm is suspected in a postmenopausal pt.   Seen by GI & colonoscopy recommended.   Seen by Gyn & pelvic ultrasound & endometrial biopsy recommended.   Her WBC increased from 9.77 to 14.7 & Hgb dropped from 9.3 to 8.6 - ? dilutional ? reactive to drop in H & H & possibly suspected malignancy.   Today her WBC has self declined to 10.60 - not typical for infectious etiology of leukocytosis  Her blood cultures are negative at 48 hours.  PLAN:  observe off antibiotics  Conservative management per GI,GYN, and hospitalist  No additional ID w/u planned, we will stop actively following, please call if ID issues arise  .daughter at bedside

## 2020-10-14 NOTE — CHART NOTE - NSCHARTNOTEFT_GEN_A_CORE
Gyn Update Gyn Update    Pt seen at bedside w/ daughter present and counseled on ultrasound findings of thickened endometrium w/ concerns for endometrial carcinoma.  Differential diagnosis of imaging findings reviewed.  Pt and daughter counseled that typical next steps would include an endometrial biopsy to evaluate the cells within the endometrial cavity for pathology.  Goals of care discussed.  Family agrees they want to have the biopsy done.  Risks/benefits/alternatives were discussed.  Family preference is to attempt EMB while inpatient to avoid additional trip to the doctor and have testing/exams completed prior to discharge.  Reviewed that will attempt EMB but given unideal set-up may not be feasible to obtain.  Consents reviewed with patient, daughter, and spouse Rashid Tomlinson who is POA.      EMB attempted at bedside.  Patient positioned using bedpan.  Cervix was cleaned w/ betadine solution.  Anterior lip of cervix grasped w/ Kailey clamp.  Attempted to pass pipelle, however os could not be located.  Procedure aborted due to patient discomfort in attempt to achieve better exposure.  Patient intends to follow up outpatient.      [facility]  [address] [place] [address], [address] [place] [address] [phone]  Ask for first available doctor    All questions answered.    Pt seen and evaluated w/ Dr. Reina Medina PGY2

## 2020-10-14 NOTE — PROGRESS NOTE ADULT - SUBJECTIVE AND OBJECTIVE BOX
Patient is a 87y old  Female who presented with a chief complaint of GI bleed (14 Oct 2020 09:53)      INTERVAL HPI/OVERNIGHT EVENTS:  s/p EGD and Colonoscopy yesterday  no GI complaints  no rectal bleeding  tolerating PO    EGD 10/13/2020  Impression:          - Normal esophagus.                       - Mild gastritis.                       - Normal examined duodenum.    COLON 10/13/2020  Impression:          - Diverticulosis in the sigmoid colon, in the descendingcolon, in the                        transverse colon and in the ascending colon.                       - The examination was otherwise normal on direct and retroflexion views.                       - No specimens collected.    MEDICATIONS  (STANDING):  amLODIPine   Tablet 10 milliGRAM(s) Oral daily  ATENolol  Tablet 50 milliGRAM(s) Oral daily  atorvastatin 10 milliGRAM(s) Oral at bedtime  ferrous    sulfate 325 milliGRAM(s) Oral daily  levothyroxine 50 MICROGram(s) Oral daily  polyethylene glycol 3350 17 Gram(s) Oral two times a day  senna 2 Tablet(s) Oral at bedtime      Allergies  No Known Allergies      Review of Systems:  General:  No wt loss, fevers, chills, night sweats,fatigue  CV:  No pain, palpitations, hypo/hypertension  Resp:  No dyspnea, cough, tachypnea, wheezing  GI:  see HPI  :  No pain, bleeding, incontinence, nocturia  Muscle:  No pain, weakness  Neuro:  No focal weakness, tingling, +dementia  Psych:  No fatigue, insomnia, mood problems, depression  Endocrine:  No polyuria, polydypsia, cold/heat intolerance  Heme:  No petechiae, ecchymosis, easy bruisability  Skin:  No rash, tattoos, scars, edema      Vital Signs Last 24 Hrs  T(C): 36.6 (14 Oct 2020 08:28), Max: 37.1 (13 Oct 2020 13:54)  T(F): 97.8 (14 Oct 2020 08:28), Max: 98.7 (13 Oct 2020 13:54)  HR: 69 (14 Oct 2020 08:28) (57 - 69)  BP: 126/68 (14 Oct 2020 08:28) (99/57 - 145/67)  BP(mean): --  RR: 18 (14 Oct 2020 08:28) (17 - 18)  SpO2: 97% (14 Oct 2020 08:28) (96% - 99%)    PHYSICAL EXAM:  Constitutional: NAD, well-developed pleasantly confused elderly female. daughter at bedside  Neck: No LAD, supple  Respiratory: Clear b/l  Cardiovascular: S1 and S2, Regular  Gastrointestinal: BS+, soft, NT/ND, neg HSM,  Rectal: normal tone +maroon colored stool, no melena. no palpable lesions  Extremities: No peripheral edema, neg clubbing, cyanosis  Vascular: 2+ peripheral pulses  Neurological: alert and appropriate but forgetful; pleasantly confused. no focal asymmetry  Psychiatric: Normal mood, normal affect  Skin: No rashes    LABS:                        8.1    11.45 )-----------( 317      ( 14 Oct 2020 07:16 )             27.6   Hemoglobin: 8.1 g/dL (10.14.20 @ 07:16)   Hemoglobin: 8.7 g/dL (10.13.20 @ 07:27)   Hemoglobin: 8.8 g/dL (10.12.20 @ 06:47)   Hemoglobin: 8.6 g/dL (10.11.20 @ 17:20)   Hemoglobin: 8.5 g/dL (10.11.20 @ 07:10)     Occult Blood, Feces (10.11.20 @ 15:01)   Occult Blood, Feces: Negative       10-14    139  |  108  |  22  ----------------------------<  89  3.9   |  24  |  0.91    Ca    9.0      14 Oct 2020 07:16        RADIOLOGY & ADDITIONAL TESTS:  EXAM:  US PELVIC COMPLETE                        EXAM:  US TRANSVAGINAL                        EXAM:  US DPLX PELVIC                        PROCEDURE DATE:  10/12/2020        INTERPRETATION:  CLINICAL INFORMATION: Postmenopausal bleeding.    COMPARISON: CT abdomen pelvis 10/8/2020.    TECHNIQUE:  Endovaginal and transabdominal pelvic sonogram. Color and Spectral Doppler was performed.    FINDINGS:    Uterus: 5.5 x 3.4 x 4.2 cm. Within normal limits.  Endometrium: Thickened endometrium measuring 16 mm with heterogeneous solid and cystic endometrial mass with vascularity.    Right ovary: 1.5 x 0.9 x 1.5 cm. Within normal limits.  Left ovary: 1.5 x 0.7 x 0.7 cm. Within normal limits.    Fluid: None.    IMPRESSION:    Heterogeneous thickened endometrial mass with tiny and small cystic components and arterial low resistant vascularity highly suspicious for endometrial carcinoma.  Alternatively, this could represent a large endometrial polyp with concerning vascularity. Sonohysterogram and/or endometrial biopsy should be performed.

## 2020-10-14 NOTE — DISCHARGE NOTE NURSING/CASE MANAGEMENT/SOCIAL WORK - PATIENT PORTAL LINK FT
You can access the FollowMyHealth Patient Portal offered by [facility] by registering at the following website: [email or web address]. By joining [employer]’s FollowMyHealth portal, you will also be able to view your health information using other applications (apps) compatible with our system.

## 2020-10-15 LAB — SURGICAL PATHOLOGY STUDY: SIGNIFICANT CHANGE UP

## 2020-10-17 LAB
CULTURE RESULTS: SIGNIFICANT CHANGE UP
CULTURE RESULTS: SIGNIFICANT CHANGE UP
SPECIMEN SOURCE: SIGNIFICANT CHANGE UP
SPECIMEN SOURCE: SIGNIFICANT CHANGE UP

## 2020-10-20 PROBLEM — I10 ESSENTIAL (PRIMARY) HYPERTENSION: Chronic | Status: ACTIVE | Noted: 2020-10-08

## 2020-10-20 PROBLEM — E78.5 HYPERLIPIDEMIA, UNSPECIFIED: Chronic | Status: ACTIVE | Noted: 2020-10-08

## 2020-11-09 ENCOUNTER — APPOINTMENT (OUTPATIENT)
Dept: GYNECOLOGIC ONCOLOGY | Facility: CLINIC | Age: 85
End: 2020-11-09
Payer: MEDICARE

## 2020-11-09 VITALS
HEART RATE: 68 BPM | SYSTOLIC BLOOD PRESSURE: 136 MMHG | HEIGHT: 62 IN | DIASTOLIC BLOOD PRESSURE: 65 MMHG | TEMPERATURE: 98 F | BODY MASS INDEX: 23 KG/M2 | WEIGHT: 125 LBS

## 2020-11-09 DIAGNOSIS — N95.0 POSTMENOPAUSAL BLEEDING: ICD-10-CM

## 2020-11-09 PROCEDURE — 99205 OFFICE O/P NEW HI 60 MIN: CPT

## 2020-11-09 PROCEDURE — 99072 ADDL SUPL MATRL&STAF TM PHE: CPT

## 2020-11-09 NOTE — PAST MEDICAL HISTORY
[Postmenopausal] : The patient is postmenopausal [Menarche Age ____] : age at menarche was [unfilled] [Menopause Age____] : age at menopause was [unfilled] [Total Preg ___] : G[unfilled] [Live Births ___] : P[unfilled]  [Full Term ___] : Full Term: [unfilled] [Abortions ___] : Abortions:[unfilled] [Living ___] : Living: [unfilled]

## 2020-11-12 LAB — CYTOLOGY CVX/VAG DOC THIN PREP: ABNORMAL

## 2020-11-14 NOTE — DISCUSSION/SUMMARY
[Reviewed Clinical Lab Test(s)] : Results of clinical tests were reviewed. [Reviewed Radiology Report(s)] : Radiology reports were reviewed. [FreeTextEntry1] : I met with the pt and her daughter who was present throughout. \par -We discussed the clinical findings and nature of inc EMS, as well as the DDx, inc malignancy, and the implications of cx stenosis. \par -Management options were reviewed, including my advise for a D&C, H/S with Sono [facility]. \par -Periop and recuperative issues were discussed.\par -A diagram was drawn, and a copy was provided.\par -I advised a clearance. \par -Her instructions were reviewed.\par -All Q/A.\par -She will call B to schedule. \par -I spoke with Dr RAHAT Romero's office ([place]) who will relay the message to call me - disc the venue [employer] OR.\par \par \par \par \par

## 2020-11-14 NOTE — REVIEW OF SYSTEMS
[Negative] : Musculoskeletal [Hypothyroidism] : hypothyroidism [Abn Vag Bleeding] : abnormal vaginal bleeding [de-identified] : HTN, dyslipidemia [de-identified] : constipation, BRBPR

## 2020-11-14 NOTE — PHYSICAL EXAM
[Absent] : CVAT: absent [Normal] : Cervix: Normal, no lesions [Abnormal] : Examination of extremities for edema and/or varicosities: Abnormal [de-identified] : 1+ edema [de-identified] : Multiple seb kers [de-identified] : atrophy [de-identified] : Os markedly stenotic [de-identified] : The uterus was nonpalpable [de-identified] : The adnexae were nonpalpable [de-identified] : I was unable to perform a Pipelle due to stenosis.

## 2020-11-14 NOTE — LETTER BODY
[Dear  ___] : Dear  [unfilled], [FreeTextEntry2] : I had the pleasure of seeing Erika Tomlinson in consultation regarding a thickened endometrium. \par \par We discussed the differential and management options, including my advise for operative sampling with hysterectomy under sonogram guidance. Due to cervical stenosis, I was unable to perform an office sampling.\par \par I will keep you advised of her findings and disposition.

## 2020-11-14 NOTE — HISTORY OF PRESENT ILLNESS
[FreeTextEntry1] : Referred by [facility]\par PCP: Dr. Fay\par GYN: Dr. Dunn\par Cardiologist: Dr. Starkey\par \par Ms. Tomlinson is a 87 year old  postmenopausal female, referred from [facility], for postmenopausal bleeding and a thickened endometrial lining noted on imaging.  She presented to the ED with bleeding.  Upon exam, she had BRBPR. No recent Gyn care. \par \par 10/8/2020- CT AP-\par   Liver- 2.6 x 2.8 cm cyst in the left lobe, 1.5 x 1.2 cm hypodense lesion with greater fluid attentuation in the right lobe\par   Gallbladder/Pancreas/Spleen/Adrenals- WNL\par   Kidneys- 1.7 x 1.1 let renal cyst\par   Uterus- 14 mm lining no masses\par   Prominent wall of the cecum/ascending proximal sigmoid colon without significant peridiverticular stranding \par \par 10/12/2020- Pelvic US- \par   Uterus- 5.5 x 3.4 x 4.2 cm EMS - 16mm\par   RTO and LTO- WNL\par \par She had a colonoscopy while in house which showed hemorrhoids and diverticulosis.\par \par Of note, she is s/p parotidectomy with postop RT for acinic cell carcinoma in \par   \par She has occasional pelvic cramping.  She has constipation.  She denies nausea/vomiting.  She has intermittent vaginal bleeding, but denies vaginal discharge.   \par \par HM\par Pap- not in many years\par Mammo/CBE- not in many years\par Colonoscopy- 10/2020

## 2020-11-23 NOTE — DISCHARGE NOTE PROVIDER - NSDCCPCAREPLAN_GEN_ALL_CORE_FT
Medicare Annual Wellness Visit  Are Name: Cecil Marmolejo Date: 2020   MRN: [id number] Sex: Male   Age: 61 y.o. Ethnicity: Non-/Non    : 1961 Race: Severa Proffer is here for Medicare AWV    Screenings for behavioral, psychosocial and functional/safety risks, and cognitive dysfunction are all negative except as indicated below. These results, as well as other patient data from the [address] form, are documented in Flowsheets linked to this Encounter. Allergies   Allergen Reactions    Penicillins          Prior to Visit Medications    Medication Sig Taking? Authorizing Provider   ALPRAZolam Chryl Smith) 1 MG tablet Take 1 tablet by mouth 3 times daily as needed for Sleep for up to 30 days. NITHYA Benton CNP   loratadine-pseudoephedrine (CLARITIN-D 24 HOUR)  MG per extended release tablet Take 1 tablet by mouth daily  NITHYA Benton CNP   gabapentin (NEURONTIN) 600 MG tablet TAKE ONE TABLET BY MOUTH FOUR TIMES A DAY  NITHYA Benton CNP   hydroCHLOROthiazide (HYDRODIURIL) 25 MG tablet Take 1 tablet by mouth daily  NITHYA Benton CNP   cariprazine hcl (VRAYLAR) 1.5 MG capsule Take 1 capsule by mouth daily  NITHYA Benton CNP   budesonide-formoterol (SYMBICORT) 160-4.5 MCG/ACT AERO Inhale 2 puffs into the lungs 2 times daily  NITHYA Benton CNP   albuterol sulfate  (90 Base) MCG/ACT inhaler Inhale 2 puffs into the lungs every 4 hours as needed for Wheezing  NITHYA Benton CNP   cetirizine (ZYRTEC) 10 MG tablet Take 1 tablet by mouth daily  NITHYA Benton CNP         Past Medical History:   Diagnosis Date    Allergic rhinitis     Anxiety     Bipolar disorder ([facility] [address]     Depression        History reviewed. No pertinent surgical history. History reviewed. No pertinent family history.     CareTeam (Including outside providers/suppliers regularly involved in providing care): Patient Care Team:  Colletta Coward, APRN - CNP as PCP - General (Family Nurse Practitioner)  Colletta Coward, APRN - CNP as PCP - [facility] Empaneled Provider    Wt Readings from Last 3 Encounters:   09/24/20 147 lb 9.6 oz (67 kg)   11/21/19 162 lb (73.5 kg)   10/24/19 162 lb (73.5 kg)      Patient-Reported Vitals 11/23/2020   Patient-Reported Systolic (No Data)      There is no height or weight on file to calculate BMI. Due to the current efforts to prevent transmission of COVID-19 and also the need to preserve PPE for other caregivers, a face-to-face encounter with the patient was not performed. That being said, all relevant records and diagnostic tests were reviewed, including laboratory results and imaging. Please reference any relevant documentation elsewhere. Care will be coordinated with the primary service. Based upon direct observation of the patient, evaluation of cognition reveals recent and remote memory intact. Patient's complete Health Risk Assessment and screening values have been reviewed and are found in Flowsheets. The following problems were reviewed today and where indicated follow up appointments were made and/or referrals ordered. Positive Risk Factor Screenings with Interventions:     Substance History:  Social History     Tobacco History     Smoking Status  Current Every Day Smoker Smoking Frequency  1 pack/day for 30 years (30 pk yrs)    Smokeless Tobacco Use  Never Used          Alcohol History     Alcohol Use Status  Not Asked          Drug Use     Drug Use Status  Yes Types  Marijuana          Sexual Activity     Sexually Active  Yes Partners  Female               Alcohol Screening:       A score of 8 or more is associated with harmful or hazardous drinking. A score of 13 or more in women, and 15 or more in men, is likely to indicate alcohol dependence.   Substance Abuse Interventions:  · Tobacco abuse:  tobacco cessation tips and resources provided    General Health and ACP: Advance Directives     Power of [facility] ACP-Advance Directive ACP-Power of     Not on File Not on File Not on File Not on File      General Health Risk Interventions:  · Pain issues: home exercises provided  · Stress: regular exercise recommended- 3-5 times per week, 30-45 minutes per session, relaxation techniques discussed  · Anger: regular exercise recommended- 3-5 times per week, 30-45 minutes per session, relaxation techniques discussed    Personalized Preventive Plan   Current Health Maintenance Status  Immunization History   Administered Date(s) Administered    Pneumococcal Polysaccharide ([id number]) 04/18/2019        Health Maintenance   Topic Date Due    Hepatitis C screen  1961    HIV screen  06/01/1976    DTaP/Tdap/Td vaccine (1 - Tdap) 06/01/1980    Shingles Vaccine (1 of 2) 06/01/2011    Low dose CT lung screening  06/01/2016    Annual Wellness Visit (AWV)  05/29/2019    Colon Cancer Screen FIT/FOBT  05/20/2020    Flu vaccine (1) 09/01/2020    Potassium monitoring  09/24/2021    Creatinine monitoring  09/24/2021    Lipid screen  09/24/2025    Pneumococcal 0-64 years Vaccine  Completed    Hepatitis A vaccine  Aged Out    Hepatitis B vaccine  Aged Out    Hib vaccine  Aged Out    Meningococcal (ACWY) vaccine  Aged Out     Recommendations for [employer] Due: see orders and patient instructions/AVS.  . Recommended screening schedule for the next 5-10 years is provided to the patient in written form: see Patient Instructions/AVS.    Jesica Toro was seen today for medicare awv. Diagnoses and all orders for this visit:    Routine general medical examination at a health care facility  -     [facility] [[id number]]  -     INFLUENZA, QUADV, 3 YRS AND OLDER, IM, MDV, 0.5ML (Jose Anthony);  Future    Screening for cardiovascular condition  -     [place] [facility] PLAN FACE TO [address] [facility], [address] [[id number]] Vivi Fay is a 61 y.o. male being evaluated by a Virtual Visit (phone) encounter to address concerns as mentioned above. A caregiver was present when appropriate. Due to this being a TeleHealth encounter (During LPWFK-01 public health emergency), evaluation of the following organ systems was limited: Vitals/Constitutional/EENT/Resp/CV/GI//MS/Neuro/Skin/Heme-Lymph-Imm. Pursuant to the emergency declaration under the [address] [place] [address] and the [employer] Act, this Virtual Visit was conducted with patient's (and/or legal guardian's) consent, to reduce the patient's risk of exposure to COVID-19 and provide necessary medical care. The patient (and/or legal guardian) has also been advised to contact this office for worsening conditions or problems, and seek emergency medical treatment and/or call 911 if deemed necessary. Patient identification was verified at the start of the visit: Yes    Services were provided through phone to substitute for in-person clinic visit. Patient and provider were located at their individual homes. --NITHYA Bejarano CNP on 12/22/2020 at 11:12 PM    An electronic signature was used to authenticate this note.     Advance Care Planning Advanced Care Planning: Discussed the patients choices for care and treatment in case of a health event that adversely affects decision-making abilities. Also discussed the patients long-term treatment options. Reviewed the process of designating a Health Care Surrogate as defined by the [facility]. Reviewed the [facility] Will Directive process and the kinds of life-sustaining treatments the patient would like to receive should they become terminally ill or permanently unconscious. Explained the state requirement to complete the forms in the presence of two eligible witnesses OR in the presence of a . Patient was asked to provide a copy of the signed forms to the practice office. Time spent (minutes): 15     Cardiovascular Disease Risk Counseling: Assessed the patient's risk to develop cardiovascular disease and reviewed main risk factors. Reviewed steps to reduce disease risk including:   · Quitting tobacco use, reducing amount smoked, or not starting the habit  · Making healthy food choices  · Being physically active and gradualy increasing activity levels   · Reduce weight and determine a healthy BMI goal  · Monitor blood pressure and treat if higher than 140/90 mmHg  · Maintain blood total cholesterol levels under 5 mmol/l or 190 mg/dl  · Maintain LDL cholesterol levels under 3.0 mmol/l or 115 mg/dl   · Control blood glucose levels  · Consider taking aspirin (75 mg daily), once blood pressure is controlled   Provided a follow up plan. Time spent (minutes): 15    Tobacco Cessation Counseling: Patient advised about behavior change, including information about personal health harms, usage of appropriate cessation measures and benefits of cessation.   Time spent (minutes): 15 LDCT Screening: Discussed with patient the benefits and harms of screening, follow-up diagnostic testing, over-diagnosis, false positive rate, and total radiation exposure. Counseled on the importance of adherence to annual lung cancer LDCT screening, impact of comorbidities, ability and willingness to undergo diagnosis and treatment. Counseled on the importance of maintaining cigarette smoking abstinence and cessation. Patient has a history of heavy tobacco use of over 30 pack years. Patient does not present signs or symptoms of lung cancer. PRINCIPAL DISCHARGE DIAGNOSIS  Diagnosis: Postmenopausal bleeding  Assessment and Plan of Treatment: Postmenopausal bleeding      SECONDARY DISCHARGE DIAGNOSES  Diagnosis: HTN (hypertension)  Assessment and Plan of Treatment:     Diagnosis: Anemia  Assessment and Plan of Treatment:      PRINCIPAL DISCHARGE DIAGNOSIS  Diagnosis: Postmenopausal bleeding  Assessment and Plan of Treatment: CT revealed thickened endometrial lining concerning for neoplasm. Transvaginal ultrasound done, GYN attempted uterine biopsy today but was unsuccessful  Follow up with GYN as out-pt      SECONDARY DISCHARGE DIAGNOSES  Diagnosis: Anemia  Assessment and Plan of Treatment: EGD: normal esophagus, normal duodenum, mild gastritis  (biopsied)  Colonoscopy: pan- diverticulosis, otherwise negative  Hemoglobin stable  Follow up with gastroenterologist for biopsy results    Diagnosis: HTN (hypertension)  Assessment and Plan of Treatment: Low salt diet  Activity as tolerated.  Take all medication as prescribed.  Follow up with your medical doctor for routine blood pressure monitoring at your next visit.  Notify your doctor if you have any of the following symptoms:   Dizziness, Lightheadedness, Blurry vision, Headache, Chest pain, Shortness of breath

## 2020-12-30 ENCOUNTER — NON-APPOINTMENT (OUTPATIENT)
Age: 85
End: 2020-12-30

## 2021-03-17 ENCOUNTER — OUTPATIENT (OUTPATIENT)
Dept: OUTPATIENT SERVICES | Facility: HOSPITAL | Age: 86
LOS: 1 days | End: 2021-03-17
Payer: MEDICARE

## 2021-03-17 ENCOUNTER — APPOINTMENT (OUTPATIENT)
Dept: ULTRASOUND IMAGING | Facility: CLINIC | Age: 86
End: 2021-03-17
Payer: MEDICARE

## 2021-03-17 DIAGNOSIS — N95.0 POSTMENOPAUSAL BLEEDING: ICD-10-CM

## 2021-03-17 DIAGNOSIS — R93.89 ABNORMAL FINDINGS ON DIAGNOSTIC IMAGING OF OTHER SPECIFIED BODY STRUCTURES: ICD-10-CM

## 2021-03-17 PROCEDURE — 76830 TRANSVAGINAL US NON-OB: CPT | Mod: 26

## 2021-03-17 PROCEDURE — 76830 TRANSVAGINAL US NON-OB: CPT

## 2021-03-29 ENCOUNTER — APPOINTMENT (OUTPATIENT)
Dept: GYNECOLOGIC ONCOLOGY | Facility: CLINIC | Age: 86
End: 2021-03-29
Payer: MEDICARE

## 2021-03-29 VITALS
DIASTOLIC BLOOD PRESSURE: 81 MMHG | HEIGHT: 62 IN | HEART RATE: 66 BPM | SYSTOLIC BLOOD PRESSURE: 176 MMHG | WEIGHT: 119 LBS | BODY MASS INDEX: 21.9 KG/M2

## 2021-03-29 DIAGNOSIS — R93.89 ABNORMAL FINDINGS ON DIAGNOSTIC IMAGING OF OTHER SPECIFIED BODY STRUCTURES: ICD-10-CM

## 2021-03-29 DIAGNOSIS — N88.2 STRICTURE AND STENOSIS OF CERVIX UTERI: ICD-10-CM

## 2021-03-29 PROCEDURE — 99214 OFFICE O/P EST MOD 30 MIN: CPT

## 2021-03-29 PROCEDURE — 99072 ADDL SUPL MATRL&STAF TM PHE: CPT

## 2021-03-30 PROBLEM — R93.89 THICKENED ENDOMETRIUM: Status: ACTIVE | Noted: 2020-11-09

## 2021-03-30 PROBLEM — N88.2 CERVICAL STENOSIS (UTERINE CERVIX): Status: ACTIVE | Noted: 2020-11-09

## 2021-04-01 ENCOUNTER — NON-APPOINTMENT (OUTPATIENT)
Age: 86
End: 2021-04-01

## 2021-05-03 ENCOUNTER — NON-APPOINTMENT (OUTPATIENT)
Age: 86
End: 2021-05-03

## 2021-08-15 ENCOUNTER — EMERGENCY (EMERGENCY)
Facility: HOSPITAL | Age: 86
LOS: 1 days | Discharge: HOME | End: 2021-08-15
Attending: STUDENT IN AN ORGANIZED HEALTH CARE EDUCATION/TRAINING PROGRAM
Payer: MEDICARE

## 2021-08-15 VITALS
SYSTOLIC BLOOD PRESSURE: 134 MMHG | WEIGHT: 130.07 LBS | RESPIRATION RATE: 18 BRPM | TEMPERATURE: 98 F | HEIGHT: 63 IN | OXYGEN SATURATION: 98 % | DIASTOLIC BLOOD PRESSURE: 70 MMHG | HEART RATE: 63 BPM

## 2021-08-15 LAB
ALBUMIN SERPL ELPH-MCNC: 4.3 G/DL — SIGNIFICANT CHANGE UP (ref 3.3–5)
ALP SERPL-CCNC: 78 U/L — SIGNIFICANT CHANGE UP (ref 40–120)
ALT FLD-CCNC: 13 U/L — SIGNIFICANT CHANGE UP (ref 10–45)
ANION GAP SERPL CALC-SCNC: 14 MMOL/L — SIGNIFICANT CHANGE UP (ref 5–17)
ANISOCYTOSIS BLD QL: SLIGHT — SIGNIFICANT CHANGE UP
APPEARANCE UR: CLEAR — SIGNIFICANT CHANGE UP
AST SERPL-CCNC: 18 U/L — SIGNIFICANT CHANGE UP (ref 10–40)
BASOPHILS # BLD AUTO: 0.11 K/UL — SIGNIFICANT CHANGE UP (ref 0–0.2)
BASOPHILS NFR BLD AUTO: 0.9 % — SIGNIFICANT CHANGE UP (ref 0–2)
BILIRUB SERPL-MCNC: 0.4 MG/DL — SIGNIFICANT CHANGE UP (ref 0.2–1.2)
BILIRUB UR-MCNC: NEGATIVE — SIGNIFICANT CHANGE UP
BUN SERPL-MCNC: 19 MG/DL — SIGNIFICANT CHANGE UP (ref 7–23)
CALCIUM SERPL-MCNC: 10 MG/DL — SIGNIFICANT CHANGE UP (ref 8.4–10.5)
CHLORIDE SERPL-SCNC: 100 MMOL/L — SIGNIFICANT CHANGE UP (ref 96–108)
CO2 SERPL-SCNC: 23 MMOL/L — SIGNIFICANT CHANGE UP (ref 22–31)
COLOR SPEC: COLORLESS — SIGNIFICANT CHANGE UP
CREAT SERPL-MCNC: 0.84 MG/DL — SIGNIFICANT CHANGE UP (ref 0.5–1.3)
DACRYOCYTES BLD QL SMEAR: SLIGHT — SIGNIFICANT CHANGE UP
DIFF PNL FLD: NEGATIVE — SIGNIFICANT CHANGE UP
ELLIPTOCYTES BLD QL SMEAR: SIGNIFICANT CHANGE UP
EOSINOPHIL # BLD AUTO: 0 K/UL — SIGNIFICANT CHANGE UP (ref 0–0.5)
EOSINOPHIL NFR BLD AUTO: 0 % — SIGNIFICANT CHANGE UP (ref 0–6)
GLUCOSE SERPL-MCNC: 131 MG/DL — HIGH (ref 70–99)
GLUCOSE UR QL: NEGATIVE — SIGNIFICANT CHANGE UP
HCT VFR BLD CALC: 36.4 % — SIGNIFICANT CHANGE UP (ref 34.5–45)
HGB BLD-MCNC: 11.2 G/DL — LOW (ref 11.5–15.5)
HYPOCHROMIA BLD QL: SLIGHT — SIGNIFICANT CHANGE UP
KETONES UR-MCNC: NEGATIVE — SIGNIFICANT CHANGE UP
LEUKOCYTE ESTERASE UR-ACNC: NEGATIVE — SIGNIFICANT CHANGE UP
LYMPHOCYTES # BLD AUTO: 1.02 K/UL — SIGNIFICANT CHANGE UP (ref 1–3.3)
LYMPHOCYTES # BLD AUTO: 8 % — LOW (ref 13–44)
MANUAL SMEAR VERIFICATION: SIGNIFICANT CHANGE UP
MCHC RBC-ENTMCNC: 22.2 PG — LOW (ref 27–34)
MCHC RBC-ENTMCNC: 30.8 GM/DL — LOW (ref 32–36)
MCV RBC AUTO: 72.1 FL — LOW (ref 80–100)
MICROCYTES BLD QL: SLIGHT — SIGNIFICANT CHANGE UP
MONOCYTES # BLD AUTO: 0.33 K/UL — SIGNIFICANT CHANGE UP (ref 0–0.9)
MONOCYTES NFR BLD AUTO: 2.6 % — SIGNIFICANT CHANGE UP (ref 2–14)
NEUTROPHILS # BLD AUTO: 11.26 K/UL — HIGH (ref 1.8–7.4)
NEUTROPHILS NFR BLD AUTO: 88.5 % — HIGH (ref 43–77)
NITRITE UR-MCNC: NEGATIVE — SIGNIFICANT CHANGE UP
PH UR: 6 — SIGNIFICANT CHANGE UP (ref 5–8)
PLAT MORPH BLD: NORMAL — SIGNIFICANT CHANGE UP
PLATELET # BLD AUTO: 452 K/UL — HIGH (ref 150–400)
POIKILOCYTOSIS BLD QL AUTO: SIGNIFICANT CHANGE UP
POLYCHROMASIA BLD QL SMEAR: SLIGHT — SIGNIFICANT CHANGE UP
POTASSIUM SERPL-MCNC: 4.1 MMOL/L — SIGNIFICANT CHANGE UP (ref 3.5–5.3)
POTASSIUM SERPL-SCNC: 4.1 MMOL/L — SIGNIFICANT CHANGE UP (ref 3.5–5.3)
PROT SERPL-MCNC: 7.5 G/DL — SIGNIFICANT CHANGE UP (ref 6–8.3)
PROT UR-MCNC: NEGATIVE — SIGNIFICANT CHANGE UP
RBC # BLD: 5.05 M/UL — SIGNIFICANT CHANGE UP (ref 3.8–5.2)
RBC # FLD: 15.6 % — HIGH (ref 10.3–14.5)
RBC BLD AUTO: ABNORMAL
SCHISTOCYTES BLD QL AUTO: SLIGHT — SIGNIFICANT CHANGE UP
SODIUM SERPL-SCNC: 137 MMOL/L — SIGNIFICANT CHANGE UP (ref 135–145)
SP GR SPEC: 1.01 — LOW (ref 1.01–1.02)
TARGETS BLD QL SMEAR: SLIGHT — SIGNIFICANT CHANGE UP
UROBILINOGEN FLD QL: NEGATIVE — SIGNIFICANT CHANGE UP
WBC # BLD: 12.72 K/UL — HIGH (ref 3.8–10.5)
WBC # FLD AUTO: 12.72 K/UL — HIGH (ref 3.8–10.5)

## 2021-08-15 PROCEDURE — 72170 X-RAY EXAM OF PELVIS: CPT | Mod: 26

## 2021-08-15 PROCEDURE — 81003 URINALYSIS AUTO W/O SCOPE: CPT

## 2021-08-15 PROCEDURE — 72125 CT NECK SPINE W/O DYE: CPT | Mod: 26,MA

## 2021-08-15 PROCEDURE — 93005 ELECTROCARDIOGRAM TRACING: CPT

## 2021-08-15 PROCEDURE — 80053 COMPREHEN METABOLIC PANEL: CPT

## 2021-08-15 PROCEDURE — 70450 CT HEAD/BRAIN W/O DYE: CPT | Mod: 26,MA

## 2021-08-15 PROCEDURE — 72170 X-RAY EXAM OF PELVIS: CPT

## 2021-08-15 PROCEDURE — 99284 EMERGENCY DEPT VISIT MOD MDM: CPT | Mod: 25

## 2021-08-15 PROCEDURE — 87086 URINE CULTURE/COLONY COUNT: CPT

## 2021-08-15 PROCEDURE — 72125 CT NECK SPINE W/O DYE: CPT | Mod: MA

## 2021-08-15 PROCEDURE — 93010 ELECTROCARDIOGRAM REPORT: CPT

## 2021-08-15 PROCEDURE — 85025 COMPLETE CBC W/AUTO DIFF WBC: CPT

## 2021-08-15 PROCEDURE — 71045 X-RAY EXAM CHEST 1 VIEW: CPT | Mod: 26

## 2021-08-15 PROCEDURE — 70450 CT HEAD/BRAIN W/O DYE: CPT | Mod: MA

## 2021-08-15 PROCEDURE — 99285 EMERGENCY DEPT VISIT HI MDM: CPT

## 2021-08-15 PROCEDURE — 71045 X-RAY EXAM CHEST 1 VIEW: CPT

## 2021-08-15 RX ORDER — ACETAMINOPHEN 500 MG
975 TABLET ORAL ONCE
Refills: 0 | Status: COMPLETED | OUTPATIENT
Start: 2021-08-15 | End: 2021-08-15

## 2021-08-15 RX ADMIN — Medication 975 MILLIGRAM(S): at 11:58

## 2021-08-15 NOTE — ED PROVIDER NOTE - PATIENT PORTAL LINK FT
You can access the FollowMyHealth Patient Portal offered by [facility] by registering at the following website: [email or web address]. By joining [facility]’s FollowMyHealth portal, you will also be able to view your health information using other applications (apps) compatible with our system.

## 2021-08-15 NOTE — ED ADULT NURSE NOTE - SUICIDE SCREENING QUESTION 1
Additional Notes: After discussion of the risks, benefits and limitations with respect to this Telehealth visit, including potential limitations in picture and video quality, the patient has given verbal consent to proceed with this Telehealth visit. Interactive audio and video telecommunications were used to permit real-time communication between myself and the patient.  This Telehealth visit was performed due to the national COVID-19 emergency and recommended social distancing. Detail Level: Simple No

## 2021-08-15 NOTE — ED PROVIDER NOTE - UNABLE TO OBTAIN
Chief Complaint: decreased vision right eye  History of Present Illness: decreased vision right eye over the past several weeks with large vitreous hemorrhage seen on exam.  Past Medical History:  Patient History      Problem List:      Diabetes mellitus    CAD - Coronary artery disease    HTN - Hypertension    Reflux    Anemia    GI bleeding    Glaucoma    Exercise tolerance    Renal insufficiency    At risk for falls    Macular degeneration    COPD (chronic obstructive pulmonary disease)    Past Surgeries:       Stented coronary artery - 2009        Allergies: Allergies (2) Active Reaction  Bystolic Edema of mouth  lisinopril cough  Medications:see medication lit  Review of Systems: pt denies any chest pain, cough, cold or flu-like symptoms    Physical Examination:   Constitutional: pt alert and in good spirits  Eyes: dense vitreous hemorrhage right eye  Respiratory: no retractions or cyanosis  Cardiovascular: regular rate and rhythm  Gastrointestinal: non-tender, non-distended    Assessment:  non-clearing vitreous hemorrhage right eye    Plan: vitreous hemorrage removal right eye.  r/b/a discussed with the patient who understands and wishes to proceed with the procedure       Dementia

## 2021-08-15 NOTE — ED ADULT NURSE NOTE - OBJECTIVE STATEMENT
88 year old female presented to ED from home with c/o of unwitnessed mechanical trip and fall today on rug in living room. Fall was heard by pt's , pt denies LOC, denies feeling dizzy before fall, reports occipital head pain from fall. pt denies CP, SOB, nausea/vomiting, numbness/tingling, fever, cough, chills, dizziness, blurred vision, neuro intact. pt a&ox3, lung sounds clear, heart rate regular, abdomen soft nontender nondistended to palp. skin intact. pt currently resting in bed comfortably with daughter at bedside. Will continue to monitor and assess while offering support and reassurance.

## 2021-08-15 NOTE — ED PROVIDER NOTE - PHYSICAL EXAMINATION
Declined Const: Well-nourished, Well-developed, appearing stated age.  HEENT: Head NCAT, no lesions. TTP at occipital head.   CVS: +S1/S2,   RESP: Unlabored respiratory effort. Clear to auscultation bilaterally.  GI: Nontender/Nondistended, No CVA tenderness b/l.   MSK: Normocephalic/Atraumatic, Lower Extremities w/o calf tenderness or edema b/l.   Skin: Warm, dry and intact.   Neuro: Motor & Sensation grossly intact.  Psych: Awake, Alert, & Oriented (AAO) x3. Appropriate mood and affect.

## 2021-08-15 NOTE — ED PROVIDER NOTE - NS ED ROS FT
CONST: no fevers, no chills, no trauma  EYES: no pain, no blurry vision   ENT: no sore throat, no epistaxis, no rhinorrhea  CV: no chest pain, no palpitations, no orthopnea, no extremity pain or swelling  RESP: no shortness of breath, no cough, no sputum, no pleurisy, no wheezing  ABD: no abdominal pain, no nausea, no vomiting, no diarrhea, no black or bloody stool  : no dysuria, no hematuria, no frequency, no urgency  MSK: above  NEURO: no headache, no sensory disturbances, no focal weakness, no dizziness  HEME: no easy bleeding or bruising  SKIN: no diaphoresis, no rash

## 2021-08-15 NOTE — ED PROVIDER NOTE - PROGRESS NOTE DETAILS
Evgeny Fernandez, PGY1 Pt tolerating meds well with mild relief. UA and imaging are normal. NSR on ECG. Non concerning ECG RBBB due to asymptomatic.

## 2021-08-15 NOTE — ED PROVIDER NOTE - CLINICAL SUMMARY MEDICAL DECISION MAKING FREE TEXT BOX
MARGARET White, Attendin yoF, PMHx of HTN, hypothyroidism, dementia p/w daughter after fall this am. Likely mech on bathroom rug but unwitnessed. Did strike head. No LOC. No AC medications. No other injuries or complaints of pain. Ambulatory at home. No recent illness. Confused at baseline. Lives at home with .    awake, alert, pleasant, confused (but at baseline), cooperative, non lateralizing exam, pelvis stable, extremities without signs of trauma, no midline vert ttp, trachea midline, abd soft, b/s b/l, well perfused, good  strength, PERRL    suspect mech fall but given dementia and unreliable historian will obtains screening blood work, ekg, urine, cxr. CTH/cspine for isolated head trauma. Doubt sig ICH but will r/o. No concern for major thoracoabdominal or extremity trauma.  Likely d/c with family if workup reassuring.   Daughter understands and amenable to plan. MARGARET White, Attendin yoF, PMHx of HTN, hypothyroidism, dementia p/w daughter after fall this am. Likely mech on bathroom rug but unwitnessed. Did strike head. No LOC. No AC medications. No other injuries or complaints of pain. Ambulatory at home. No recent illness. Confused at baseline. Lives at home with .    awake, alert, pleasant, confused (but at baseline), cooperative, non lateralizing exam, pelvis stable, extremities without signs of trauma, no midline vert ttp, trachea midline, abd soft, b/s b/l, well perfused, good  strength, PERRL    suspect mech fall but given dementia and unreliable historian will obtains screening blood work, ekg, urine, cxr. CTH/cspine for isolated head trauma. Doubt sig ICH but will r/o. No concern for major thoracoabdominal or extremity trauma.  Likely d/c with family if workup reassuring.  Daughter understands and amenable to plan.

## 2021-08-15 NOTE — ED PROVIDER NOTE - OBJECTIVE STATEMENT
Pt is an 89 yo with PM of HT nand diverticulosis who presents after an unwitnessed fall in her bathroom. Pt at baseline is not a good historian and daughter is here to help fill in gaps. According to daughter, mother seems to be at her baseline and is not altered. Family believes she tripped in the bathroom and hit her head. Denies LOC, CP, palpitations, Pt is an 87 yo with PM of HTN and diverticulosis, not on blood thinners who presents after an unwitnessed fall in her bathroom. Pt at baseline is not a good historian and daughter is here to help fill in gaps. According to daughter, mother seems to be at her baseline and is not altered. Family believes she tripped in the bathroom and hit her head. Pt cannot remember who was with her, whther she was in the bathroom or kitchen, and who brought her to the hospital. Denies LOC, CP, palpitations, SOB at the time or after. Pt only complains of occipital head pain, but denies any blood or bruises. Denies any F/C/ nausea/ vomitting before and after fall. Denies dysuria or diarrhea, or recent illness. no headache, no sensory disturbances, no focal weakness, no dizziness

## 2021-08-15 NOTE — ED PROVIDER NOTE - NSFOLLOWUPINSTRUCTIONS_ED_ALL_ED_FT
YOU WERE HERE FOR : TRAUMA TO HEAD AFTER A FALL    WHILE HERE YOU HAD: CAT SCAN HEAD, NECK, CHEST XRAY, XRAY OF PELVIS.    ECG AND BLOOD WORK WERE NOT CONCERNING.    YOU'RE RESULTS SHOWED NO FRACTURES OR BLEEDS.     PLEASE FOLLOW UP WITH YOUR PRIMARY CARE IN THE NEXT FEW DAYS FOR POSSIBLE THYROID NODULES FOUND ON IMAGING. TAKE YOUR RESULTS WITH YOU.     PLEASE RETURN IF WORSENING PAIN, NAUSEA OR VOMITING DEVELOPS AS WE DISCUSSED.     You may take 500-1000 mg acetaminophen every 6 hours, as needed for pain  You may take 600 mg ibuprofen every 8 hours, with food, as needed for pain       Head Injury, Adult    There are many types of head injuries. Head injuries can be as minor as a small bump, or they can be a serious medical issue. More severe head injuries include:  •A jarring injury to the brain (concussion).      •A bruise (contusion) of the brain. This means there is bleeding in the brain that can cause swelling.      •A cracked skull (skull fracture).      •Bleeding in the brain that collects, clots, and forms a bump (hematoma).      After a head injury, most problems occur within the first 24 hours, but side effects may occur up to 7–10 days after the injury. It is important to watch your condition for any changes. You may need to be observed in the emergency department or urgent care, or you may be admitted to the hospital.      What are the causes?    There are many possible causes of a head injury. Serious head injuries may be caused by car accidents, bicycle or motorcycle accidents, sports injuries, falls, or being struck by an object.      What are the symptoms?  Symptoms of a head injury include a contusion, bump, or bleeding at the site of the injury. Other physical symptoms may include:  •Headache.      •Nausea or vomiting.      •Dizziness.      •Blurred or double vision.      •Being uncomfortable around bright lights or loud noises.      •Seizures.      •Feeling tired.      •Trouble being awakened.      •Loss of consciousness.    Mental or emotional symptoms may include:  •Irritability.      •Confusion and memory problems.      •Poor attention and concentration.      •Changes in eating or sleeping habits.      •Anxiety or depression.        How is this diagnosed?    This condition can usually be diagnosed based on your symptoms, a description of the injury, and a physical exam. You may also have imaging tests done, such as a CT scan or an MRI.      How is this treated?    Treatment for this condition depends on the severity and type of injury you have. The main goal of treatment is to prevent complications and allow the brain time to heal.    Mild head injury   If you have a mild head injury, you may be sent home, and treatment may include:  •Observation. A responsible adult should stay with you for 24 hours after your injury and check on you often.      •Physical rest.      •Brain rest.      •Pain medicines.      Severe head injury  If you have a severe head injury, treatment may include:•Close observation. This includes hospitalization with the following care:  •Frequent physical exams.      •Frequent checks of how your brain and nervous system are working (neurological status).      •Checking your blood pressure and oxygen levels.        •Medicines to relieve pain, prevent seizures, and decrease brain swelling.      •Airway protection and breathing support. This may include using a ventilator.      •Treatments that monitor and manage swelling inside the brain.    •Brain surgery. This may be needed to:  •Remove a collection of blood or blood clots.      •Stop the bleeding.      •Remove a part of the skull to allow room for the brain to swell.          Follow these instructions at home:    Activity     •Rest and avoid activities that are physically hard or tiring.      •Make sure you get enough sleep.    •Let your brain rest by limiting activities that require a lot of thought or attention, such as:  •Watching TV.      •Playing memory games and puzzles.      •Job-related work or homework.      •Working on the computer, using social media, and texting.        •Avoid activities that could cause another head injury, such as playing sports, until your health care provider approves. Having another head injury, especially before the first one has healed, can be dangerous.      •Ask your health care provider when it is safe for you to return to your regular activities, including work or school. Ask your health care provider for a step-by-step plan for gradually returning to activities.      •Ask your health care provider when you can drive, ride a bicycle, or use heavy machinery. Your ability to react may be slower after a brain injury. Do not do these activities if you are dizzy.        Lifestyle      • Do not drink alcohol until your health care provider approves. Do not use drugs. Alcohol and certain drugs may slow your recovery and can put you at risk of further injury.      •If it is harder than usual to remember things, write them down.      •If you are easily distracted, try to do one thing at a time.      •Talk with family members or close friends when making important decisions.      •Tell your friends, family, a trusted colleague, and  about your injury, symptoms, and restrictions. Have them watch for any new or worsening problems.      General instructions     •Take over-the-counter and prescription medicines only as told by your health care provider.      •Have someone stay with you for 24 hours after your head injury. This person should watch you for any changes in your symptoms and be ready to seek medical help.      •Keep all follow-up visits as told by your health care provider. This is important.        How is this prevented?    •Work on improving your balance and strength to avoid falls.      •Wear a seat belt when you are in a moving vehicle.      •Wear a helmet when riding a bicycle, skiing, or doing any other sport or activity that has a risk of injury.    •If you drink alcohol:•Limit how much you use to:  •0–1 drink a day for nonpregnant women.      •0–2 drinks a day for men.        •Be aware of how much alcohol is in your drink. In the [place] one drink equals one 12 oz bottle of beer (355 mL), one 5 oz glass of wine (148 mL), or one 1½ oz glass of hard liquor (44 mL).      •Take safety measures in your home, such as:  •Removing clutter and tripping hazards from floors and stairways.      •Using grab bars in bathrooms and handrails by stairs.      •Placing non-slip mats on floors and in bathtubs.      •Improving lighting in dim areas.          Where to find more information    •Centers for Disease Control and Prevention: www.cdc.gov        Get help right away if:  •You have:  •A severe headache that is not helped by medicine.      •Trouble walking or weakness in your arms and legs.      •Clear or bloody fluid coming from your nose or ears.      •Changes in your vision.      •A seizure.      •Increased confusion or irritability.        •Your symptoms get worse.      •You are sleepier than normal and have trouble staying awake.      •You lose your balance.      •Your pupils change size.      •Your speech is slurred.      •Your dizziness gets worse.      •You vomit.      These symptoms may represent a serious problem that is an emergency. Do not wait to see if the symptoms will go away. Get medical help right away. Call your local emergency services (911 in the [place] Do not drive yourself to the hospital.       Summary    •Head injuries can be minor, or they can be a serious medical issue requiring immediate attention.      •Treatment for this condition depends on the severity and type of injury you have.      •Have someone stay with you for 24 hours after your injury and check on you often.      •Ask your health care provider when it is safe for you to return to your regular activities, including work or school.      •Head injury prevention includes wearing a seat belt in a motor vehicle, using a helmet on a bicycle, limiting alcohol use, and taking safety measures in your home.      This information is not intended to replace advice given to you by your health care provider. Make sure you discuss any questions you have with your health care provider.

## 2021-08-15 NOTE — ED ADULT TRIAGE NOTE - CHIEF COMPLAINT QUOTE
Slipped and fell in the bathroom.  Denies LOC, and dizziness.  Reports head injury, pain to rt side and back of head.  Denies blood thinner

## 2021-08-16 LAB
CULTURE RESULTS: SIGNIFICANT CHANGE UP
SPECIMEN SOURCE: SIGNIFICANT CHANGE UP

## 2021-09-24 NOTE — PATIENT PROFILE ADULT - BRADEN SCORE
20 Rhomboid Transposition Flap Text: The defect edges were debeveled with a #15 scalpel blade.  Given the location of the defect and the proximity to free margins a rhomboid transposition flap was deemed most appropriate.  Using a sterile surgical marker, an appropriate rhomboid flap was drawn incorporating the defect.    The area thus outlined was incised deep to adipose tissue with a #15 scalpel blade.  The skin margins were undermined to an appropriate distance in all directions utilizing iris scissors.

## 2022-03-07 NOTE — ED ADULT NURSE NOTE - NS ED NURSE PATIENT LEFT UNIT TIME
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 Currently we are waiting to see if you may be an active carrier or have a resolved infection. The urine studies revealed no significant abnormalities. Several tests are currently pending and once have returned you will be notified of the abnormalities. 02:30

## 2022-03-30 ENCOUNTER — EMERGENCY (EMERGENCY)
Facility: HOSPITAL | Age: 87
LOS: 1 days | Discharge: HOME | End: 2022-03-30
Attending: EMERGENCY MEDICINE
Payer: MEDICARE

## 2022-03-30 VITALS
RESPIRATION RATE: 18 BRPM | HEART RATE: 63 BPM | SYSTOLIC BLOOD PRESSURE: 156 MMHG | OXYGEN SATURATION: 95 % | TEMPERATURE: 98 F | DIASTOLIC BLOOD PRESSURE: 70 MMHG

## 2022-03-30 VITALS
HEART RATE: 78 BPM | WEIGHT: 130.07 LBS | HEIGHT: 63 IN | OXYGEN SATURATION: 98 % | SYSTOLIC BLOOD PRESSURE: 156 MMHG | TEMPERATURE: 98 F | DIASTOLIC BLOOD PRESSURE: 80 MMHG | RESPIRATION RATE: 20 BRPM

## 2022-03-30 PROCEDURE — G1004: CPT

## 2022-03-30 PROCEDURE — 72125 CT NECK SPINE W/O DYE: CPT | Mod: 26,MG

## 2022-03-30 PROCEDURE — 99285 EMERGENCY DEPT VISIT HI MDM: CPT

## 2022-03-30 PROCEDURE — 72125 CT NECK SPINE W/O DYE: CPT | Mod: MG

## 2022-03-30 PROCEDURE — 99284 EMERGENCY DEPT VISIT MOD MDM: CPT | Mod: 25

## 2022-03-30 PROCEDURE — 70450 CT HEAD/BRAIN W/O DYE: CPT | Mod: MG

## 2022-03-30 PROCEDURE — 70450 CT HEAD/BRAIN W/O DYE: CPT | Mod: 26,MG

## 2022-03-30 NOTE — ED PROVIDER NOTE - CLINICAL SUMMARY MEDICAL DECISION MAKING FREE TEXT BOX
88yF w/ fall and head strike not on AC, Exam largely unremarkable aside from hematoma, plan for CT head and C spine, per daughter, patient currently at baseline. 88yF w/ fall and head strike not on AC, Exam largely unremarkable aside from hematoma, plan for CT head and C spine, per daughter, patient currently at baseline.  --BMM

## 2022-03-30 NOTE — ED PROVIDER NOTE - OBJECTIVE STATEMENT
88yF w/ pmhx of HTN, dementia presents with fall and head strike. Per daughter who accompanies patient, patient was being helped in the bathroom by her  when she fell backwards striking back of head on the bathtub. No LOC, EMS was called at that time but pt refused transport. Has not had any nausea or vomiting since, no visual changes. Daughter then convinced patient to come to ED. No blood thinners, Patient notes focal pain to the back of her head, no bleeding, no neck pain, no other pain or injuries.

## 2022-03-30 NOTE — ED ADULT TRIAGE NOTE - CHIEF COMPLAINT QUOTE
swelling at back of head s/p slipped and fall at the shower, hit head at bath tub as witnessed by , no LOC, not on blood thinner

## 2022-03-30 NOTE — ED PROVIDER NOTE - PHYSICAL EXAMINATION
I have review the triage vital signs   Const: Well nourished and developed. Appears stated age. Awake, alert, in no acute distress.  Head: small occipital hematoma, tender to palpation, no step off, no ecchymosis, no peralta sign no raccoon eyes, no facial pain or swelling   Eyes: No conjunctival pallor, PERRL, EOMI  Ear, Nose, Throat: Normal appearing externally. No external throat swelling.  Neck: Ranging without difficulty or pain. No midline tenderness to palpation or step off  BACK: no midline tenderness, or step off.   Respiratory: No acute respiratory distress. Lungs CTAB.  Cardiovascular: Regular rate and rhythm. No edema.   Abdominal: Soft, nontender, nondistended. No rebound or guarding.  MSK: No gross deformity, R volar wrist brace for prior injury in place but no pain currently  Neuro: The patient is alert and oriented to name, know we are in [place] and knows hometown unsure of current hospital, year, and current president , conversive, moving all extremities equally and spontaneously with normal str bilaterally in UE and LE/   Skin: Warm, dry, intact. No skin injury overlying hematoma

## 2022-03-30 NOTE — ED PROVIDER NOTE - IV ALTEPLASE ADMIN OUTSIDE HIDDEN
VSS. Tele afib CVR with BBB. A&O x4. Deaf, communicating via pen and paper effective. Denies pain. No stool overnight. NPO since midnight for possible repeat endoscopy today per GI. Up SBA in room. AM hemoglobin pending.    show

## 2022-03-30 NOTE — ED PROVIDER NOTE - PROGRESS NOTE DETAILS
Offered social work service for falls in the home prevention daughter states they are currently in the works of trying to get an aid in the house and do not want to talk to social work at this time. CT imaging is negative, patient without new complaints, to be taken home by daughter, Had long discussion about CT findings of thyroid heterogeneity, patient to f/u outpatient for workup, CT results provided to daughter    Will discharge. Discussed the case with patient and/or family.  Instructed urgent follow up with primary care doctor for review of their ED visit (labs, radiology, etc.) Also instructed follow up for any specialty services as needed. Patient and/or family are aware to return to ED with any new or worsening symptoms. All questions were answered and the opportunity for to ask questions were given. Patient and/or family verbalized understanding of the above instructions.

## 2022-03-30 NOTE — ED ADULT NURSE NOTE - OBJECTIVE STATEMENT
88y Female presents to the ED s/p fall. Pt states she was in the bathroom when she fell and hit the back of her head on the bath tub. Pt denies LOC, and taking blood thinners. Pt reports she was able to get herself up immediately after the fall. Pt denies n/v/d, dizziness, weakness, blurred vision, double vision, photophobia, CP, SOB, abd pain, and dysuria. Pt is A&Ox3. Patient safety maintained, bed is in lowest position, wheels locked, and side rails raised.

## 2022-03-30 NOTE — ED PROVIDER NOTE - NSFOLLOWUPINSTRUCTIONS_ED_ALL_ED_FT
Advance activity as tolerated. Continue all previously prescribed medications as directed unless otherwise instructed.  Follow up with your primary care physician in 48-72 hours- bring copies of your results.     Please return to the ED if symptoms worsen, persist, or do not resolve, or if new symptoms develop, including, but not limited to, the following: fevers, chills, nausea, vomiting, chest pain, palpitations, "blackouts" (loss of consciousness), shortness of breath, wheezing, difficulty breathing, abdominal pain, back pain, trouble going to the bathroom, weakness, difficulty walking, headache, speech changes, visual changes, or numbness/tingling.     If you have issues obtaining follow up, please call: [phone] to obtain a doctor or specialist who takes your insurance in your area.  You may call [phone] to make an appointment with the internal medicine clinic.    Be sure to monitor for vomiting, mental status changes, visual changes, return to ED if occurs.

## 2022-03-30 NOTE — ED PROVIDER NOTE - PATIENT PORTAL LINK FT
You can access the FollowMyHealth Patient Portal offered by [facility] by registering at the following website: [email or web address]. By joining [facility]’s FollowMyHealth portal, you will also be able to view your health information using other applications (apps) compatible with our system.

## 2022-03-31 NOTE — ED POST DISCHARGE NOTE - RESULT SUMMARY
Incidental/Recommended Values Report follow up. Chart reviewed, patient already made aware of incidental findings as outlined in results report. No further ED contact warranted at this time. - Jeff Gregg PA-C.

## 2022-06-28 NOTE — ED PROVIDER NOTE - DATE/TIME 1
Detail Level: Detailed Body Location Override (Optional - Billing Will Still Be Based On Selected Body Map Location If Applicable): left dorsal hand Size Of Lesion: 3.3 X Size Of Lesion In Cm (Optional): 2.9 Name Of The Referring Provider For Procedure: EDIN Hong MD Incorporate Mauc In Note: Yes 15-Aug-2021 14:14

## 2022-08-05 NOTE — ED ADULT NURSE NOTE - SKIN TEMPERATURE MOISTURE
[FreeTextEntry1] : Patient's eye was itchy yesterday, not painful and without discharge.  today she complains minimally of the other eye being itchy.    No other symptoms.  \par \par \par Plan: Pataday\par 
warm

## 2022-11-07 NOTE — ED ADULT TRIAGE NOTE - TEMPERATURE IN CELSIUS (DEGREES C)
Transferred to MRI via bed. No sign of pain or discomfort. Awake and alert. No seizure activity noted   36.9

## 2023-02-13 ENCOUNTER — OFFICE (OUTPATIENT)
Dept: URBAN - METROPOLITAN AREA CLINIC 90 | Facility: CLINIC | Age: 88
Setting detail: OPHTHALMOLOGY
End: 2023-02-13
Payer: COMMERCIAL

## 2023-02-13 DIAGNOSIS — H40.013: ICD-10-CM

## 2023-02-13 DIAGNOSIS — H43.391: ICD-10-CM

## 2023-02-13 DIAGNOSIS — H25.12: ICD-10-CM

## 2023-02-13 DIAGNOSIS — H17.9: ICD-10-CM

## 2023-02-13 DIAGNOSIS — H35.371: ICD-10-CM

## 2023-02-13 DIAGNOSIS — H25.89: ICD-10-CM

## 2023-02-13 DIAGNOSIS — H35.3131: ICD-10-CM

## 2023-02-13 PROCEDURE — 92014 COMPRE OPH EXAM EST PT 1/>: CPT | Performed by: OPHTHALMOLOGY

## 2023-02-13 PROCEDURE — 92134 CPTRZ OPH DX IMG PST SGM RTA: CPT | Performed by: OPHTHALMOLOGY

## 2023-02-13 ASSESSMENT — REFRACTION_MANIFEST
OS_SPHERE: +1.25
OD_AXIS: 135
OD_SPHERE: PLANO
OD_VA2: 20/40+
OD_VA1: 20/60+2
OD_CYLINDER: -1.50
OS_VA1: 20/400
OS_CYLINDER: SPH
OD_ADD: +3.50

## 2023-02-13 ASSESSMENT — TONOMETRY
OD_IOP_MMHG: 10
OS_IOP_MMHG: 10

## 2023-02-13 ASSESSMENT — REFRACTION_CURRENTRX
OD_OVR_VA: 20/
OD_AXIS: 136
OD_SPHERE: +1.00
OD_AXIS: 120
OS_VPRISM_DIRECTION: BF
OD_VPRISM_DIRECTION: BF
OS_SPHERE: +1.25
OS_SPHERE: +4.25
OS_VPRISM_DIRECTION: BF
OS_OVR_VA: 20/
OS_CYLINDER: -0.25
OS_CYLINDER: SPH
OS_OVR_VA: 20/
OD_CYLINDER: -1.50
OD_ADD: +3.25
OD_CYLINDER: -1.25
OS_ADD: +3.25
OD_OVR_VA: 20/
OD_VPRISM_DIRECTION: BF
OS_AXIS: 01
OD_SPHERE: +4.00

## 2023-02-13 ASSESSMENT — CORNEAL DYSTROPHY - POSTERIOR
OS_POSTERIORDYSTROPHY: MILD GUTTATA
OD_POSTERIORDYSTROPHY: MILD GUTTATA

## 2023-02-13 ASSESSMENT — SPHEQUIV_DERIVED: OD_SPHEQUIV: -0.875

## 2023-02-13 ASSESSMENT — KERATOMETRY
OD_K2POWER_DIOPTERS: 43.25
OD_AXISANGLE_DEGREES: 065
OD_K1POWER_DIOPTERS: 42.75

## 2023-02-13 ASSESSMENT — REFRACTION_AUTOREFRACTION
OD_AXIS: 126
OD_CYLINDER: -1.25
OD_SPHERE: -0.25

## 2023-02-13 ASSESSMENT — CONFRONTATIONAL VISUAL FIELD TEST (CVF)
OD_FINDINGS: FULL
OS_FINDINGS: FULL

## 2023-02-13 ASSESSMENT — AXIALLENGTH_DERIVED: OD_AL: 24.1284

## 2023-02-13 ASSESSMENT — LACRIMAL DUCT - ASSESSMENT
OS_LACRIMAL_DUCT: PUNCTAL EVERSION
OD_LACRIMAL_DUCT: PUNCTAL EVERSION

## 2023-02-13 ASSESSMENT — VISUAL ACUITY
OD_BCVA: 20/CF@2FT
OS_BCVA: 20/100-1

## 2023-07-03 NOTE — PATIENT PROFILE ADULT - PRO INTERPRETER NEED 2
English Azathioprine Pregnancy And Lactation Text: This medication is Pregnancy Category D and isn't considered safe during pregnancy. It is unknown if this medication is excreted in breast milk.

## 2023-08-01 ENCOUNTER — NON-APPOINTMENT (OUTPATIENT)
Age: 88
End: 2023-08-01

## 2023-08-01 ENCOUNTER — APPOINTMENT (OUTPATIENT)
Dept: INTERNAL MEDICINE | Facility: CLINIC | Age: 88
End: 2023-08-01
Payer: MEDICARE

## 2023-08-01 VITALS
HEIGHT: 59 IN | OXYGEN SATURATION: 95 % | HEART RATE: 52 BPM | DIASTOLIC BLOOD PRESSURE: 74 MMHG | WEIGHT: 122 LBS | TEMPERATURE: 97.7 F | BODY MASS INDEX: 24.6 KG/M2 | SYSTOLIC BLOOD PRESSURE: 159 MMHG

## 2023-08-01 VITALS — DIASTOLIC BLOOD PRESSURE: 66 MMHG | SYSTOLIC BLOOD PRESSURE: 134 MMHG

## 2023-08-01 DIAGNOSIS — Z00.00 ENCOUNTER FOR GENERAL ADULT MEDICAL EXAMINATION W/OUT ABNORMAL FINDINGS: ICD-10-CM

## 2023-08-01 DIAGNOSIS — E78.00 PURE HYPERCHOLESTEROLEMIA, UNSPECIFIED: ICD-10-CM

## 2023-08-01 DIAGNOSIS — D50.8 OTHER IRON DEFICIENCY ANEMIAS: ICD-10-CM

## 2023-08-01 PROCEDURE — 99214 OFFICE O/P EST MOD 30 MIN: CPT | Mod: 25

## 2023-08-01 PROCEDURE — 36415 COLL VENOUS BLD VENIPUNCTURE: CPT

## 2023-08-01 PROCEDURE — G0439: CPT

## 2023-08-01 RX ORDER — MELOXICAM 15 MG/1
TABLET ORAL
Refills: 0 | Status: DISCONTINUED | COMMUNITY
End: 2023-08-01

## 2023-08-01 RX ORDER — PSYLLIUM HUSK 0.4 G
CAPSULE ORAL
Refills: 0 | Status: DISCONTINUED | COMMUNITY
End: 2023-08-01

## 2023-08-01 RX ORDER — FUROSEMIDE 80 MG/1
TABLET ORAL
Refills: 0 | Status: DISCONTINUED | COMMUNITY
End: 2023-08-01

## 2023-08-01 RX ORDER — LEVOTHYROXINE SODIUM 137 UG/1
TABLET ORAL
Refills: 0 | Status: DISCONTINUED | COMMUNITY
End: 2023-08-01

## 2023-08-01 RX ORDER — AMLODIPINE BESYLATE 5 MG/1
TABLET ORAL
Refills: 0 | Status: DISCONTINUED | COMMUNITY
End: 2023-08-01

## 2023-08-01 RX ORDER — DOCUSATE SODIUM 100 MG/1
TABLET ORAL
Refills: 0 | Status: DISCONTINUED | COMMUNITY
End: 2023-08-01

## 2023-08-01 RX ORDER — GLUC/MSM/COLGN2/HYAL/ANTIARTH3 375-375-20
TABLET ORAL
Refills: 0 | Status: DISCONTINUED | COMMUNITY
End: 2023-08-01

## 2023-08-01 NOTE — ASSESSMENT
[FreeTextEntry1] : Blood work done today, daughters are patient's proxy, will check CBC given history of anemia, did have a colonoscopy a few years ago and endoscopy.  Check thyroid function test and declined any further management we will check vitamin B12 levels patient does have shortness of breath oxygen 95 we will check D-dimer and x-ray ordered did advise also follow-up with cardiology

## 2023-08-01 NOTE — HISTORY OF PRESENT ILLNESS
[FreeTextEntry1] : 89-year-old female presents for subsequent Medicare annual wellness visit [de-identified] : Patient does have a history of dementia, has not been evaluated declined any evaluation daughter still lives in [place] patient has been forgetful.  Patient denies any further management.  Does live with   takes care of ADLs.  Patient denies any change in bowel habits change in appetite change in weight.  Patient denies any chest pain coughing, daughter has noticed patient panting going up stairs does occur twice patient is mostly immobile.  There is no lower extremity edema fevers chills noticed no coughing or wheezing noticed.

## 2023-08-01 NOTE — PHYSICAL EXAM
[Normal Appearance] : normal in appearance [No Masses] : no palpable masses [No Nipple Discharge] : no nipple discharge [No Axillary Lymphadenopathy] : no axillary lymphadenopathy [Normal] : normal gait, coordination grossly intact, no focal deficits and deep tendon reflexes were 2+ and symmetric [de-identified] : right upper sternal border heart murmur 2/6

## 2023-08-01 NOTE — HEALTH RISK ASSESSMENT
[Fair] :  ~his/her~ mood as fair [FreeTextEntry1] : Health maintenance shortness of breath [No] : No [Medical reason not done] : Medical reason not done [Change in mental status noted] : Change in mental status noted [Language] : difficulty with language [Behavior] : difficulty with behavior [Learning/Retaining New Information] : denies difficulty learning/retaining new information [Handling Complex Tasks] : difficulty handling complex tasks [Reasoning] : difficulty with reasoning [Spatial Ability and Orientation] : denies difficulty with spatial ability and orientation [None] : None [Independent] : bathing [Some assistance needed] : doing laundry [Full assistance needed] : managing finances [Reports changes in hearing] : Reports no changes in hearing [Reports changes in vision] : Reports no changes in vision [Reports normal functional visual acuity (ie: able to read med bottle)] : Reports normal functional visual acuity [Reports changes in dental health] : Reports no changes in dental health [Smoke Detector] : smoke detector [Carbon Monoxide Detector] : carbon monoxide detector [Guns at Home] : guns at home [Seat Belt] : does not use seat belt [Sunscreen] : does not use sunscreen [Travel to Developing Areas] : does not  travel to developing areas [TB Exposure] : is not being exposed to tuberculosis [Caregiver Concerns] : does not have caregiver concerns [Reviewed no changes] : Reviewed, no changes [AdvancecareDate] : 7/23

## 2023-08-05 DIAGNOSIS — R79.89 OTHER SPECIFIED ABNORMAL FINDINGS OF BLOOD CHEMISTRY: ICD-10-CM

## 2023-08-05 DIAGNOSIS — R06.02 SHORTNESS OF BREATH: ICD-10-CM

## 2023-08-05 LAB
25(OH)D3 SERPL-MCNC: 49.2 NG/ML
ALBUMIN SERPL ELPH-MCNC: 4.8 G/DL
ALP BLD-CCNC: 116 U/L
ALT SERPL-CCNC: 24 U/L
ANION GAP SERPL CALC-SCNC: 14 MMOL/L
APPEARANCE: CLEAR
AST SERPL-CCNC: 26 U/L
BILIRUB SERPL-MCNC: 0.4 MG/DL
BILIRUBIN URINE: NEGATIVE
BLOOD URINE: NEGATIVE
BUN SERPL-MCNC: 17 MG/DL
CALCIUM SERPL-MCNC: 10.6 MG/DL
CHLORIDE SERPL-SCNC: 100 MMOL/L
CHOLEST SERPL-MCNC: 216 MG/DL
CO2 SERPL-SCNC: 29 MMOL/L
COLOR: YELLOW
CREAT SERPL-MCNC: 0.92 MG/DL
DEPRECATED D DIMER PPP IA-ACNC: 517 NG/ML DDU
EGFR: 60 ML/MIN/1.73M2
ESTIMATED AVERAGE GLUCOSE: 114 MG/DL
GLUCOSE QUALITATIVE U: NEGATIVE MG/DL
GLUCOSE SERPL-MCNC: 118 MG/DL
HBA1C MFR BLD HPLC: 5.6 %
HDLC SERPL-MCNC: 80 MG/DL
KETONES URINE: NEGATIVE MG/DL
LDLC SERPL CALC-MCNC: 117 MG/DL
LEUKOCYTE ESTERASE URINE: NEGATIVE
NITRITE URINE: NEGATIVE
NONHDLC SERPL-MCNC: 136 MG/DL
PH URINE: 6.5
POTASSIUM SERPL-SCNC: 4.2 MMOL/L
PROT SERPL-MCNC: 7.5 G/DL
PROTEIN URINE: NEGATIVE MG/DL
SODIUM SERPL-SCNC: 143 MMOL/L
SPECIFIC GRAVITY URINE: 1.01
TRIGL SERPL-MCNC: 112 MG/DL
TSH SERPL-ACNC: 2.67 UIU/ML
UROBILINOGEN URINE: 0.2 MG/DL
VIT B12 SERPL-MCNC: 1126 PG/ML

## 2023-08-15 ENCOUNTER — APPOINTMENT (OUTPATIENT)
Dept: INTERNAL MEDICINE | Facility: CLINIC | Age: 88
End: 2023-08-15
Payer: MEDICARE

## 2023-08-15 DIAGNOSIS — E83.52 HYPERCALCEMIA: ICD-10-CM

## 2023-08-15 PROCEDURE — 36415 COLL VENOUS BLD VENIPUNCTURE: CPT

## 2023-08-21 LAB
ALBUMIN MFR SERPL ELPH: 58.3 %
ALBUMIN SERPL-MCNC: 4 G/DL
ALBUMIN/GLOB SERPL: 1.4 RATIO
ALBUPE: 15.7 %
ALPHA1 GLOB MFR SERPL ELPH: 5.5 %
ALPHA1 GLOB SERPL ELPH-MCNC: 0.4 G/DL
ALPHA1UPE: 33.5 %
ALPHA2 GLOB MFR SERPL ELPH: 9.5 %
ALPHA2 GLOB SERPL ELPH-MCNC: 0.7 G/DL
ALPHA2UPE: 20.3 %
B-GLOBULIN MFR SERPL ELPH: 9.6 %
B-GLOBULIN SERPL ELPH-MCNC: 0.7 G/DL
BETAUPE: 13.5 %
CA-I SERPL-SCNC: 5.3 MG/DL
CALCIUM SERPL-MCNC: 9.9 MG/DL
GAMMA GLOB FLD ELPH-MCNC: 1.2 G/DL
GAMMA GLOB MFR SERPL ELPH: 17.1 %
GAMMAUPE: 17 %
IGA 24H UR QL IFE: NORMAL
INTERPRETATION SERPL IEP-IMP: NORMAL
KAPPA LC 24H UR QL: NORMAL
M PROTEIN SPEC IFE-MCNC: NORMAL
PARATHYROID HORMONE INTACT: 92 PG/ML
PROT PATTERN 24H UR ELPH-IMP: NORMAL
PROT SERPL-MCNC: 6.9 G/DL
PROT SERPL-MCNC: 6.9 G/DL
PROT UR-MCNC: 4 MG/DL
PROT UR-MCNC: <4 MG/DL

## 2023-10-22 NOTE — ED ADULT NURSE NOTE - CHIEF COMPLAINT QUOTE
97.5
swelling at back of head s/p slipped and fall at the shower, hit head at bath tub as witnessed by , no LOC, not on blood thinner

## 2023-10-23 ENCOUNTER — RX ONLY (RX ONLY)
Age: 88
End: 2023-10-23

## 2023-10-23 ENCOUNTER — OFFICE (OUTPATIENT)
Dept: URBAN - METROPOLITAN AREA CLINIC 90 | Facility: CLINIC | Age: 88
Setting detail: OPHTHALMOLOGY
End: 2023-10-23
Payer: MEDICARE

## 2023-10-23 DIAGNOSIS — H35.371: ICD-10-CM

## 2023-10-23 DIAGNOSIS — H43.391: ICD-10-CM

## 2023-10-23 DIAGNOSIS — H17.9: ICD-10-CM

## 2023-10-23 DIAGNOSIS — H25.12: ICD-10-CM

## 2023-10-23 DIAGNOSIS — H18.513: ICD-10-CM

## 2023-10-23 DIAGNOSIS — H35.3131: ICD-10-CM

## 2023-10-23 DIAGNOSIS — H35.40: ICD-10-CM

## 2023-10-23 DIAGNOSIS — H40.013: ICD-10-CM

## 2023-10-23 DIAGNOSIS — H25.89: ICD-10-CM

## 2023-10-23 PROCEDURE — 92014 COMPRE OPH EXAM EST PT 1/>: CPT | Performed by: OPHTHALMOLOGY

## 2023-10-23 ASSESSMENT — KERATOMETRY
OD_AXISANGLE_DEGREES: 065
OD_K2POWER_DIOPTERS: 43.25
OD_K1POWER_DIOPTERS: 42.75

## 2023-10-23 ASSESSMENT — REFRACTION_MANIFEST
OD_CYLINDER: -1.50
OD_VA1: 20/60+2
OD_VA2: 20/40+
OS_SPHERE: +1.25
OS_VA1: 20/400
OS_CYLINDER: SPH
OD_AXIS: 135
OD_ADD: +3.50
OD_SPHERE: PLANO

## 2023-10-23 ASSESSMENT — REFRACTION_CURRENTRX
OS_AXIS: 01
OD_CYLINDER: -1.50
OD_AXIS: 136
OD_OVR_VA: 20/
OD_ADD: +3.25
OD_VPRISM_DIRECTION: BF
OS_SPHERE: +4.25
OS_CYLINDER: SPH
OS_VPRISM_DIRECTION: BF
OS_SPHERE: +1.25
OD_AXIS: 120
OS_OVR_VA: 20/
OD_SPHERE: +4.00
OD_OVR_VA: 20/
OD_VPRISM_DIRECTION: BF
OD_SPHERE: +1.00
OS_VPRISM_DIRECTION: BF
OS_OVR_VA: 20/
OS_ADD: +3.25
OS_CYLINDER: -0.25
OD_CYLINDER: -1.25

## 2023-10-23 ASSESSMENT — VISUAL ACUITY
OD_BCVA: 20/CF@2FT
OS_BCVA: 20/80+2

## 2023-10-23 ASSESSMENT — REFRACTION_AUTOREFRACTION
OD_AXIS: 126
OD_CYLINDER: -1.25
OD_SPHERE: -0.25

## 2023-10-23 ASSESSMENT — CORNEAL DYSTROPHY - POSTERIOR
OS_POSTERIORDYSTROPHY: MILD GUTTATA
OD_POSTERIORDYSTROPHY: MILD GUTTATA

## 2023-10-23 ASSESSMENT — TONOMETRY
OD_IOP_MMHG: 11
OS_IOP_MMHG: 13

## 2023-10-23 ASSESSMENT — CONFRONTATIONAL VISUAL FIELD TEST (CVF)
OS_FINDINGS: FULL
OD_FINDINGS: FULL

## 2023-10-23 ASSESSMENT — SPHEQUIV_DERIVED: OD_SPHEQUIV: -0.875

## 2023-10-23 ASSESSMENT — LACRIMAL DUCT - ASSESSMENT
OS_LACRIMAL_DUCT: PUNCTAL EVERSION
OD_LACRIMAL_DUCT: PUNCTAL EVERSION

## 2023-10-23 ASSESSMENT — AXIALLENGTH_DERIVED: OD_AL: 24.1284

## 2023-11-08 ENCOUNTER — APPOINTMENT (OUTPATIENT)
Dept: INTERNAL MEDICINE | Facility: CLINIC | Age: 88
End: 2023-11-08
Payer: MEDICARE

## 2023-11-08 VITALS — DIASTOLIC BLOOD PRESSURE: 68 MMHG | SYSTOLIC BLOOD PRESSURE: 136 MMHG

## 2023-11-08 DIAGNOSIS — R10.9 UNSPECIFIED ABDOMINAL PAIN: ICD-10-CM

## 2023-11-08 PROCEDURE — 36415 COLL VENOUS BLD VENIPUNCTURE: CPT

## 2023-11-08 PROCEDURE — 99214 OFFICE O/P EST MOD 30 MIN: CPT | Mod: 25

## 2023-11-08 RX ORDER — CHLORHEXIDINE GLUCONATE 4 %
325 (65 FE) LIQUID (ML) TOPICAL
Qty: 90 | Refills: 0 | Status: ACTIVE | COMMUNITY
Start: 2023-11-08

## 2023-11-16 LAB
25(OH)D3 SERPL-MCNC: 47.6 NG/ML
ALBUMIN SERPL ELPH-MCNC: 4.4 G/DL
ALP BLD-CCNC: 118 U/L
ALT SERPL-CCNC: 27 U/L
ANION GAP SERPL CALC-SCNC: 13 MMOL/L
APPEARANCE: CLEAR
AST SERPL-CCNC: 24 U/L
BASOPHILS # BLD AUTO: 0.1 K/UL
BASOPHILS NFR BLD AUTO: 0.8 %
BILIRUB SERPL-MCNC: 0.2 MG/DL
BILIRUBIN URINE: NEGATIVE
BLOOD URINE: NEGATIVE
BUN SERPL-MCNC: 25 MG/DL
CALCIUM SERPL-MCNC: 10.1 MG/DL
CHLORIDE SERPL-SCNC: 99 MMOL/L
CHOLEST SERPL-MCNC: 203 MG/DL
CO2 SERPL-SCNC: 28 MMOL/L
COLOR: YELLOW
CREAT SERPL-MCNC: 0.94 MG/DL
EGFR: 58 ML/MIN/1.73M2
EOSINOPHIL # BLD AUTO: 0.2 K/UL
EOSINOPHIL NFR BLD AUTO: 1.7 %
ESTIMATED AVERAGE GLUCOSE: 117 MG/DL
GLUCOSE QUALITATIVE U: NEGATIVE MG/DL
GLUCOSE SERPL-MCNC: 88 MG/DL
HBA1C MFR BLD HPLC: 5.7 %
HCT VFR BLD CALC: 40.2 %
HDLC SERPL-MCNC: 71 MG/DL
HGB BLD-MCNC: 12 G/DL
IMM GRANULOCYTES NFR BLD AUTO: 0.3 %
KETONES URINE: NEGATIVE MG/DL
LDLC SERPL CALC-MCNC: 104 MG/DL
LEUKOCYTE ESTERASE URINE: NEGATIVE
LYMPHOCYTES # BLD AUTO: 2.78 K/UL
LYMPHOCYTES NFR BLD AUTO: 23.1 %
MAN DIFF?: NORMAL
MCHC RBC-ENTMCNC: 22.1 PG
MCHC RBC-ENTMCNC: 29.9 GM/DL
MCV RBC AUTO: 74.2 FL
MONOCYTES # BLD AUTO: 1.41 K/UL
MONOCYTES NFR BLD AUTO: 11.7 %
NEUTROPHILS # BLD AUTO: 7.51 K/UL
NEUTROPHILS NFR BLD AUTO: 62.4 %
NITRITE URINE: NEGATIVE
NONHDLC SERPL-MCNC: 132 MG/DL
PH URINE: 6
PLATELET # BLD AUTO: 422 K/UL
POTASSIUM SERPL-SCNC: 4.3 MMOL/L
PROT SERPL-MCNC: 7 G/DL
PROTEIN URINE: NEGATIVE MG/DL
RBC # BLD: 5.42 M/UL
RBC # FLD: 17 %
SODIUM SERPL-SCNC: 140 MMOL/L
SPECIFIC GRAVITY URINE: 1.01
TRIGL SERPL-MCNC: 166 MG/DL
TSH SERPL-ACNC: 2.7 UIU/ML
UROBILINOGEN URINE: 0.2 MG/DL
VIT B12 SERPL-MCNC: 1101 PG/ML
WBC # FLD AUTO: 12.04 K/UL

## 2023-11-28 ENCOUNTER — APPOINTMENT (OUTPATIENT)
Dept: ULTRASOUND IMAGING | Facility: CLINIC | Age: 88
End: 2023-11-28
Payer: MEDICARE

## 2023-11-28 ENCOUNTER — OUTPATIENT (OUTPATIENT)
Dept: OUTPATIENT SERVICES | Facility: HOSPITAL | Age: 88
LOS: 1 days | End: 2023-11-28
Payer: MEDICARE

## 2023-11-28 DIAGNOSIS — R10.9 UNSPECIFIED ABDOMINAL PAIN: ICD-10-CM

## 2023-11-28 PROCEDURE — 76700 US EXAM ABDOM COMPLETE: CPT | Mod: 26

## 2023-11-28 PROCEDURE — 76700 US EXAM ABDOM COMPLETE: CPT

## 2023-12-01 ENCOUNTER — NON-APPOINTMENT (OUTPATIENT)
Age: 88
End: 2023-12-01

## 2023-12-08 ENCOUNTER — APPOINTMENT (OUTPATIENT)
Dept: INTERNAL MEDICINE | Facility: CLINIC | Age: 88
End: 2023-12-08
Payer: MEDICARE

## 2023-12-08 VITALS
SYSTOLIC BLOOD PRESSURE: 148 MMHG | DIASTOLIC BLOOD PRESSURE: 118 MMHG | HEART RATE: 82 BPM | BODY MASS INDEX: 26 KG/M2 | HEIGHT: 59 IN | OXYGEN SATURATION: 97 % | WEIGHT: 129 LBS

## 2023-12-08 VITALS — DIASTOLIC BLOOD PRESSURE: 82 MMHG | SYSTOLIC BLOOD PRESSURE: 136 MMHG

## 2023-12-08 DIAGNOSIS — M79.89 OTHER SPECIFIED SOFT TISSUE DISORDERS: ICD-10-CM

## 2023-12-08 PROCEDURE — 99214 OFFICE O/P EST MOD 30 MIN: CPT

## 2023-12-12 ENCOUNTER — APPOINTMENT (OUTPATIENT)
Dept: PODIATRY | Facility: CLINIC | Age: 88
End: 2023-12-12

## 2023-12-29 ENCOUNTER — OUTPATIENT (OUTPATIENT)
Dept: OUTPATIENT SERVICES | Facility: HOSPITAL | Age: 88
LOS: 1 days | End: 2023-12-29
Payer: MEDICARE

## 2023-12-29 ENCOUNTER — APPOINTMENT (OUTPATIENT)
Dept: ULTRASOUND IMAGING | Facility: CLINIC | Age: 88
End: 2023-12-29
Payer: MEDICARE

## 2023-12-29 DIAGNOSIS — M79.89 OTHER SPECIFIED SOFT TISSUE DISORDERS: ICD-10-CM

## 2023-12-29 PROCEDURE — 93971 EXTREMITY STUDY: CPT

## 2023-12-29 PROCEDURE — 93971 EXTREMITY STUDY: CPT | Mod: 26,RT

## 2024-01-17 ENCOUNTER — RX RENEWAL (OUTPATIENT)
Age: 89
End: 2024-01-17

## 2024-02-07 ENCOUNTER — APPOINTMENT (OUTPATIENT)
Dept: INTERNAL MEDICINE | Facility: CLINIC | Age: 89
End: 2024-02-07
Payer: MEDICARE

## 2024-02-07 VITALS
DIASTOLIC BLOOD PRESSURE: 73 MMHG | HEART RATE: 65 BPM | TEMPERATURE: 98.5 F | WEIGHT: 129 LBS | OXYGEN SATURATION: 91 % | SYSTOLIC BLOOD PRESSURE: 160 MMHG | HEIGHT: 59 IN | BODY MASS INDEX: 26 KG/M2

## 2024-02-07 VITALS — DIASTOLIC BLOOD PRESSURE: 74 MMHG | SYSTOLIC BLOOD PRESSURE: 130 MMHG

## 2024-02-07 DIAGNOSIS — K29.70 GASTRITIS, UNSPECIFIED, W/OUT BLEEDING: ICD-10-CM

## 2024-02-07 DIAGNOSIS — D50.9 IRON DEFICIENCY ANEMIA, UNSPECIFIED: ICD-10-CM

## 2024-02-07 DIAGNOSIS — J30.0 VASOMOTOR RHINITIS: ICD-10-CM

## 2024-02-07 PROCEDURE — 99214 OFFICE O/P EST MOD 30 MIN: CPT | Mod: 25

## 2024-02-07 PROCEDURE — 36415 COLL VENOUS BLD VENIPUNCTURE: CPT

## 2024-02-07 RX ORDER — FAMOTIDINE 40 MG/1
40 TABLET, FILM COATED ORAL DAILY
Qty: 90 | Refills: 1 | Status: ACTIVE | COMMUNITY
Start: 2024-02-07 | End: 1900-01-01

## 2024-02-07 RX ORDER — FUROSEMIDE 40 MG/1
40 TABLET ORAL
Qty: 90 | Refills: 3 | Status: ACTIVE | COMMUNITY
Start: 2023-08-01 | End: 1900-01-01

## 2024-02-07 RX ORDER — IPRATROPIUM BROMIDE 42 UG/1
0.06 SPRAY NASAL 3 TIMES DAILY
Qty: 1 | Refills: 1 | Status: ACTIVE | COMMUNITY
Start: 2024-02-07 | End: 1900-01-01

## 2024-02-07 NOTE — ADDENDUM
[FreeTextEntry1] : I, Stacie Farrell, acted as a scribe on behalf of Dr. Davin Dozier MD, on 02/07/2024.   All medical entries made by the scribe were at my, Dr. Davin Dozier MD, direction and personally dictated by me on 02/07/2024. I have reviewed the chart and agree that the record accurately reflects my personal performance of the history, physical exam, assessment and plan. I have also personally directed, reviewed, and agreed with the chart.

## 2024-02-07 NOTE — HISTORY OF PRESENT ILLNESS
[FreeTextEntry1] : Patient presents for follow-up.  [de-identified] : Patient c/o belching after eating. Denies any nausea, vomiting, regurgitation of food, decreased appetite, fever, chills, night sweats.  Also, have clear runny nose in the morning from both nostrils. Denies any pain, headache, congestion.

## 2024-02-07 NOTE — ASSESSMENT
[FreeTextEntry1] : -Repeat BP is stable. Continue current management. -Dementia is currently stable -Check CBC given Hx of anemia. Assess TFTs.  Possible gastritis causing belching. Pepcid ordered.

## 2024-02-14 LAB
25(OH)D3 SERPL-MCNC: 47.8 NG/ML
ALBUMIN SERPL ELPH-MCNC: 4.3 G/DL
ALP BLD-CCNC: 125 U/L
ALT SERPL-CCNC: 31 U/L
ANION GAP SERPL CALC-SCNC: 13 MMOL/L
APPEARANCE: CLEAR
AST SERPL-CCNC: 28 U/L
BILIRUB SERPL-MCNC: 0.4 MG/DL
BILIRUBIN URINE: NEGATIVE
BLOOD URINE: NEGATIVE
BUN SERPL-MCNC: 25 MG/DL
CALCIUM SERPL-MCNC: 10 MG/DL
CHLORIDE SERPL-SCNC: 101 MMOL/L
CHOLEST SERPL-MCNC: 181 MG/DL
CO2 SERPL-SCNC: 30 MMOL/L
COLOR: YELLOW
CREAT SERPL-MCNC: 0.91 MG/DL
EGFR: 60 ML/MIN/1.73M2
ESTIMATED AVERAGE GLUCOSE: 120 MG/DL
FERRITIN SERPL-MCNC: 473 NG/ML
GLUCOSE QUALITATIVE U: NEGATIVE MG/DL
GLUCOSE SERPL-MCNC: 67 MG/DL
HBA1C MFR BLD HPLC: 5.8 %
HCT VFR BLD CALC: 40.1 %
HDLC SERPL-MCNC: 66 MG/DL
HGB BLD-MCNC: 12 G/DL
IRON SATN MFR SERPL: 30 %
IRON SERPL-MCNC: 74 UG/DL
KETONES URINE: NEGATIVE MG/DL
LDLC SERPL CALC-MCNC: 95 MG/DL
LEUKOCYTE ESTERASE URINE: NEGATIVE
MCHC RBC-ENTMCNC: 21.9 PG
MCHC RBC-ENTMCNC: 29.9 GM/DL
MCV RBC AUTO: 73.3 FL
NITRITE URINE: NEGATIVE
NONHDLC SERPL-MCNC: 115 MG/DL
PH URINE: 7
PLATELET # BLD AUTO: 457 K/UL
POTASSIUM SERPL-SCNC: 4.4 MMOL/L
PROT SERPL-MCNC: 7.1 G/DL
PROTEIN URINE: NEGATIVE MG/DL
RBC # BLD: 5.47 M/UL
RBC # FLD: 17.2 %
SODIUM SERPL-SCNC: 143 MMOL/L
SPECIFIC GRAVITY URINE: 1.01
TIBC SERPL-MCNC: 245 UG/DL
TRIGL SERPL-MCNC: 112 MG/DL
TSH SERPL-ACNC: 3.04 UIU/ML
UIBC SERPL-MCNC: 171 UG/DL
UROBILINOGEN URINE: 0.2 MG/DL
VIT B12 SERPL-MCNC: 1076 PG/ML
WBC # FLD AUTO: 14.97 K/UL

## 2024-03-13 DIAGNOSIS — G47.00 INSOMNIA, UNSPECIFIED: ICD-10-CM

## 2024-03-13 RX ORDER — SUVOREXANT 5 MG/1
5 TABLET, FILM COATED ORAL
Qty: 30 | Refills: 0 | Status: DISCONTINUED | COMMUNITY
Start: 2023-12-08 | End: 2024-03-13

## 2024-04-15 ENCOUNTER — OFFICE (OUTPATIENT)
Dept: URBAN - METROPOLITAN AREA CLINIC 90 | Facility: CLINIC | Age: 89
Setting detail: OPHTHALMOLOGY
End: 2024-04-15
Payer: MEDICARE

## 2024-04-15 DIAGNOSIS — H35.3131: ICD-10-CM

## 2024-04-15 PROBLEM — H18.523 EPITHELIAL JUVENILE CORNEAL DYSTROPHY; BOTH EYES: Status: ACTIVE | Noted: 2024-04-15

## 2024-04-15 PROBLEM — H02.824 LID LESION; LEFT UPPER LID: Status: ACTIVE | Noted: 2024-04-15

## 2024-04-15 PROCEDURE — 92134 CPTRZ OPH DX IMG PST SGM RTA: CPT | Performed by: OPHTHALMOLOGY

## 2024-04-15 PROCEDURE — 92014 COMPRE OPH EXAM EST PT 1/>: CPT | Performed by: OPHTHALMOLOGY

## 2024-05-01 RX ORDER — PAROXETINE HYDROCHLORIDE 20 MG/1
20 TABLET, FILM COATED ORAL
Qty: 90 | Refills: 3 | Status: ACTIVE | COMMUNITY
Start: 2023-08-01 | End: 1900-01-01

## 2024-05-08 ENCOUNTER — LABORATORY RESULT (OUTPATIENT)
Age: 89
End: 2024-05-08

## 2024-05-08 ENCOUNTER — APPOINTMENT (OUTPATIENT)
Dept: INTERNAL MEDICINE | Facility: CLINIC | Age: 89
End: 2024-05-08
Payer: MEDICARE

## 2024-05-08 VITALS
HEIGHT: 59 IN | DIASTOLIC BLOOD PRESSURE: 61 MMHG | SYSTOLIC BLOOD PRESSURE: 145 MMHG | BODY MASS INDEX: 27.21 KG/M2 | HEART RATE: 55 BPM | WEIGHT: 135 LBS | TEMPERATURE: 98 F | OXYGEN SATURATION: 98 %

## 2024-05-08 DIAGNOSIS — R13.10 DYSPHAGIA, UNSPECIFIED: ICD-10-CM

## 2024-05-08 DIAGNOSIS — E04.1 NONTOXIC SINGLE THYROID NODULE: ICD-10-CM

## 2024-05-08 DIAGNOSIS — I10 ESSENTIAL (PRIMARY) HYPERTENSION: ICD-10-CM

## 2024-05-08 DIAGNOSIS — K59.00 CONSTIPATION, UNSPECIFIED: ICD-10-CM

## 2024-05-08 DIAGNOSIS — E03.9 HYPOTHYROIDISM, UNSPECIFIED: ICD-10-CM

## 2024-05-08 PROCEDURE — 99214 OFFICE O/P EST MOD 30 MIN: CPT

## 2024-05-08 PROCEDURE — G2211 COMPLEX E/M VISIT ADD ON: CPT

## 2024-05-08 RX ORDER — RAMELTEON 8 MG/1
8 TABLET ORAL
Qty: 30 | Refills: 3 | Status: DISCONTINUED | COMMUNITY
Start: 2024-03-13 | End: 2024-05-08

## 2024-05-09 NOTE — PHYSICAL EXAM
[No Edema] : there was no peripheral edema [Normal] : soft, non-tender, non-distended, no masses palpated, no HSM and normal bowel sounds [de-identified] : In wheelchair [de-identified] : Quiet sitting in wheelchair unable to respond to questions.

## 2024-05-09 NOTE — ASSESSMENT
[FreeTextEntry1] : - BP is stable. Continue current management. - Check A1c. Lipid panel, vitamin levels, and urine analysis.  -Advised MiraLAX for constipation may be contributing to change in mental status may be progressively worsening dementia check electrolytes, lungs are clear to auscultation.  I discussed the importance of routine orientation.  If patient becomes physically violent can start Seroquel daughter to discuss.  Given the coughing with eating speech and swallow evaluation ordered. -Thyroid U/S ordered given history of thyroid nodule - RTO in 3 months    Pt verbalized understanding and will reach should any questions/concerns occur.

## 2024-05-09 NOTE — HISTORY OF PRESENT ILLNESS
[FreeTextEntry1] : Pt presents for follow up.  [de-identified] : Patient presents to the office for follow-up is accompanied by daughter and aide.  Has been having increased agitation of the as of late.  Can sometimes be violent with aide.  Has been having less bowel movements are no fevers change in appetite.  Symptoms tend to occur in the evening with sunset.  There has been no trauma or change in medications.  Sometimes there is coughing with eating, particularly with meat.  There has been no regurgitation patient denies any pain.

## 2024-05-09 NOTE — ADDENDUM
[FreeTextEntry1] : I, Qian Chicas, acted as a scribe on behalf of Dr. Davin Dozier MD, on 05/08/2024.   All medical entries made by the scribe were at my, Dr. Davin Dozier MD, direction and personally dictated by me on 05/08/2024. I have reviewed the chart and agree that the record accurately reflects my personal performance of the history, physical exam, assessment and plan. I have also personally directed, reviewed, and agreed with the chart.

## 2024-05-17 LAB
25(OH)D3 SERPL-MCNC: 45.8 NG/ML
ALBUMIN SERPL ELPH-MCNC: 4.1 G/DL
ALP BLD-CCNC: 125 U/L
ALT SERPL-CCNC: 23 U/L
ANION GAP SERPL CALC-SCNC: 13 MMOL/L
APPEARANCE: CLEAR
AST SERPL-CCNC: 20 U/L
BASOPHILS # BLD AUTO: 0.09 K/UL
BASOPHILS NFR BLD AUTO: 0.9 %
BILIRUB SERPL-MCNC: 0.3 MG/DL
BILIRUBIN URINE: NEGATIVE
BLOOD URINE: NEGATIVE
BUN SERPL-MCNC: 24 MG/DL
CALCIUM SERPL-MCNC: 9.8 MG/DL
CHLORIDE SERPL-SCNC: 101 MMOL/L
CHOLEST SERPL-MCNC: 175 MG/DL
CO2 SERPL-SCNC: 27 MMOL/L
COLOR: YELLOW
CREAT SERPL-MCNC: 0.94 MG/DL
EGFR: 58 ML/MIN/1.73M2
EOSINOPHIL # BLD AUTO: 0.18 K/UL
EOSINOPHIL NFR BLD AUTO: 1.8 %
ESTIMATED AVERAGE GLUCOSE: 114 MG/DL
GLUCOSE QUALITATIVE U: NEGATIVE MG/DL
GLUCOSE SERPL-MCNC: 102 MG/DL
HBA1C MFR BLD HPLC: 5.6 %
HCT VFR BLD CALC: 40.7 %
HDLC SERPL-MCNC: 65 MG/DL
HGB BLD-MCNC: 12.1 G/DL
IMM GRANULOCYTES NFR BLD AUTO: 0.3 %
KETONES URINE: NEGATIVE MG/DL
LDLC SERPL CALC-MCNC: 91 MG/DL
LEUKOCYTE ESTERASE URINE: ABNORMAL
LYMPHOCYTES # BLD AUTO: 2.63 K/UL
LYMPHOCYTES NFR BLD AUTO: 25.7 %
MAN DIFF?: NORMAL
MCHC RBC-ENTMCNC: 21.8 PG
MCHC RBC-ENTMCNC: 29.7 GM/DL
MCV RBC AUTO: 73.5 FL
MONOCYTES # BLD AUTO: 1.35 K/UL
MONOCYTES NFR BLD AUTO: 13.2 %
NEUTROPHILS # BLD AUTO: 5.96 K/UL
NEUTROPHILS NFR BLD AUTO: 58.1 %
NITRITE URINE: NEGATIVE
NONHDLC SERPL-MCNC: 110 MG/DL
PH URINE: 6
PLATELET # BLD AUTO: 469 K/UL
POTASSIUM SERPL-SCNC: 4.1 MMOL/L
PROT SERPL-MCNC: 7.1 G/DL
PROTEIN URINE: NEGATIVE MG/DL
RBC # BLD: 5.54 M/UL
RBC # FLD: 16.7 %
SODIUM SERPL-SCNC: 140 MMOL/L
SPECIFIC GRAVITY URINE: 1.01
TRIGL SERPL-MCNC: 112 MG/DL
TSH SERPL-ACNC: 3.14 UIU/ML
UROBILINOGEN URINE: 0.2 MG/DL
VIT B12 SERPL-MCNC: 929 PG/ML
WBC # FLD AUTO: 10.24 K/UL

## 2024-05-23 ENCOUNTER — RX RENEWAL (OUTPATIENT)
Age: 89
End: 2024-05-23

## 2024-05-23 RX ORDER — AMLODIPINE BESYLATE 10 MG/1
10 TABLET ORAL DAILY
Qty: 90 | Refills: 0 | Status: ACTIVE | COMMUNITY
Start: 2023-08-01 | End: 1900-01-01

## 2024-05-28 ENCOUNTER — APPOINTMENT (OUTPATIENT)
Dept: ULTRASOUND IMAGING | Facility: CLINIC | Age: 89
End: 2024-05-28
Payer: MEDICARE

## 2024-05-28 PROCEDURE — 76536 US EXAM OF HEAD AND NECK: CPT

## 2024-05-28 RX ORDER — ATENOLOL 50 MG/1
50 TABLET ORAL
Qty: 90 | Refills: 3 | Status: ACTIVE | COMMUNITY
Start: 2023-08-01 | End: 1900-01-01

## 2024-05-28 RX ORDER — ATORVASTATIN CALCIUM 10 MG/1
10 TABLET, FILM COATED ORAL
Qty: 1 | Refills: 0 | Status: ACTIVE | COMMUNITY
Start: 2023-08-01 | End: 1900-01-01

## 2024-06-18 ENCOUNTER — APPOINTMENT (OUTPATIENT)
Dept: INTERNAL MEDICINE | Facility: CLINIC | Age: 89
End: 2024-06-18
Payer: MEDICARE

## 2024-06-18 VITALS
DIASTOLIC BLOOD PRESSURE: 69 MMHG | OXYGEN SATURATION: 97 % | HEIGHT: 59 IN | HEART RATE: 52 BPM | SYSTOLIC BLOOD PRESSURE: 139 MMHG | TEMPERATURE: 97.7 F

## 2024-06-18 DIAGNOSIS — R51.9 HEADACHE, UNSPECIFIED: ICD-10-CM

## 2024-06-18 PROCEDURE — 99214 OFFICE O/P EST MOD 30 MIN: CPT

## 2024-06-18 PROCEDURE — G2211 COMPLEX E/M VISIT ADD ON: CPT

## 2024-06-19 LAB
INFLUENZA A RESULT: NOT DETECTED
INFLUENZA B RESULT: NOT DETECTED
RESP SYN VIRUS RESULT: NOT DETECTED
SARS-COV-2 RESULT: NOT DETECTED

## 2024-06-19 NOTE — PHYSICAL EXAM
[Normal] : no acute distress, well nourished, well developed and well-appearing [de-identified] : In wheelchair, Strength out of 5 out of 5 in the upper and lower extremities unable to follow commands to assess for dysdiadochokinesis,  denies any decreased sensation

## 2024-06-19 NOTE — HISTORY OF PRESENT ILLNESS
[FreeTextEntry8] : Patient presents to the office has a headache on top of the head [place], denies any head trauma nausea vomiting Tylenol does not alleviate.  Patient is accompanied by daughter and aide history taken by daughter and aide.

## 2024-06-19 NOTE — ASSESSMENT
[FreeTextEntry1] : Headache may be tension headache CT scan ordered patient is a poor historian secondary to dementia, will also check for COVID.

## 2024-06-23 ENCOUNTER — NON-APPOINTMENT (OUTPATIENT)
Age: 89
End: 2024-06-23

## 2024-06-24 ENCOUNTER — NON-APPOINTMENT (OUTPATIENT)
Age: 89
End: 2024-06-24

## 2024-06-24 RX ORDER — QUETIAPINE FUMARATE 25 MG/1
25 TABLET ORAL
Qty: 90 | Refills: 0 | Status: ACTIVE | COMMUNITY
Start: 2024-05-08

## 2024-06-26 DIAGNOSIS — U07.1 COVID-19: ICD-10-CM

## 2024-06-26 RX ORDER — NIRMATRELVIR AND RITONAVIR 150-100 MG
10 X 150 MG & KIT ORAL
Qty: 20 | Refills: 0 | Status: ACTIVE | COMMUNITY
Start: 2024-06-26 | End: 1900-01-01

## 2024-06-27 DIAGNOSIS — F02.80 DEMENTIA IN OTHER DISEASES CLASSIFIED ELSEWHERE W/OUT BEHAVIORAL DISTURBANCE: ICD-10-CM

## 2024-06-28 ENCOUNTER — APPOINTMENT (OUTPATIENT)
Dept: CT IMAGING | Facility: IMAGING CENTER | Age: 89
End: 2024-06-28

## 2024-07-24 ENCOUNTER — RX RENEWAL (OUTPATIENT)
Age: 89
End: 2024-07-24

## 2024-07-24 ENCOUNTER — TRANSCRIPTION ENCOUNTER (OUTPATIENT)
Age: 89
End: 2024-07-24

## 2024-07-29 ENCOUNTER — RX RENEWAL (OUTPATIENT)
Age: 89
End: 2024-07-29

## 2024-08-14 ENCOUNTER — APPOINTMENT (OUTPATIENT)
Dept: CT IMAGING | Facility: IMAGING CENTER | Age: 89
End: 2024-08-14
Payer: MEDICARE

## 2024-08-14 ENCOUNTER — APPOINTMENT (OUTPATIENT)
Dept: INTERNAL MEDICINE | Facility: CLINIC | Age: 89
End: 2024-08-14

## 2024-08-14 VITALS
SYSTOLIC BLOOD PRESSURE: 128 MMHG | DIASTOLIC BLOOD PRESSURE: 64 MMHG | HEIGHT: 59 IN | HEART RATE: 64 BPM | OXYGEN SATURATION: 98 % | TEMPERATURE: 97.3 F

## 2024-08-14 DIAGNOSIS — Z00.00 ENCOUNTER FOR GENERAL ADULT MEDICAL EXAMINATION W/OUT ABNORMAL FINDINGS: ICD-10-CM

## 2024-08-14 DIAGNOSIS — F02.80 DEMENTIA IN OTHER DISEASES CLASSIFIED ELSEWHERE W/OUT BEHAVIORAL DISTURBANCE: ICD-10-CM

## 2024-08-14 DIAGNOSIS — I10 ESSENTIAL (PRIMARY) HYPERTENSION: ICD-10-CM

## 2024-08-14 DIAGNOSIS — M79.89 OTHER SPECIFIED SOFT TISSUE DISORDERS: ICD-10-CM

## 2024-08-14 DIAGNOSIS — E03.9 HYPOTHYROIDISM, UNSPECIFIED: ICD-10-CM

## 2024-08-14 DIAGNOSIS — E78.00 PURE HYPERCHOLESTEROLEMIA, UNSPECIFIED: ICD-10-CM

## 2024-08-14 PROCEDURE — 70450 CT HEAD/BRAIN W/O DYE: CPT | Mod: 26

## 2024-08-14 PROCEDURE — 36415 COLL VENOUS BLD VENIPUNCTURE: CPT

## 2024-08-14 PROCEDURE — G0439: CPT

## 2024-08-14 PROCEDURE — 99213 OFFICE O/P EST LOW 20 MIN: CPT | Mod: 25

## 2024-08-16 NOTE — HISTORY OF PRESENT ILLNESS
[Family Member] : family member [FreeTextEntry1] : Patient presents for subsequent Medicare annual wellness visit [de-identified] : Patient is accompanied by daughter and caretaker.  Has been sleeping better at night, does have some swelling of the lower extremities does tend to keep her feet down.  Does have slight improvement in the morning.  Patient unable to verbalize any symptoms

## 2024-08-16 NOTE — ASSESSMENT
[FreeTextEntry1] : Blood work done today, the swelling may be secondary to orthostasis and calcium channel blocker, will check proBNP liver kidney function test dementia stable continue with home care. Blood pressure is acceptable continue current management thyroid function tests ordered for the hypothyroidism.

## 2024-08-16 NOTE — HEALTH RISK ASSESSMENT
[Poor] : ~his/her~ mood as  poor [FreeTextEntry1] : Dementia [No] : No [Patient not ambulatory] : Patient is not ambulatory [Medical reason not done] : Medical reason not done [NO] : No [Change in mental status noted] : Change in mental status noted [Language] : difficulty with language [Behavior] : difficulty with behavior [Learning/Retaining New Information] : difficulty learning/retaining new information [Handling Complex Tasks] : difficulty handling complex tasks [Reasoning] : difficulty with reasoning [None] : None [Spatial Ability and Orientation] : difficulty with spatial ability and orientation [With Family] : lives with family [Full assistance needed] : managing finances [Reports changes in hearing] : Reports no changes in hearing [Reports changes in vision] : Reports no changes in vision [Reports changes in dental health] : Reports no changes in dental health [Smoke Detector] : smoke detector [Carbon Monoxide Detector] : carbon monoxide detector [Safety elements used in home] : safety elements used in home [Caregiver Concerns] : does not have caregiver concerns

## 2024-08-20 ENCOUNTER — LABORATORY RESULT (OUTPATIENT)
Age: 89
End: 2024-08-20

## 2024-08-20 DIAGNOSIS — D72.829 ELEVATED WHITE BLOOD CELL COUNT, UNSPECIFIED: ICD-10-CM

## 2024-08-20 DIAGNOSIS — R79.89 OTHER SPECIFIED ABNORMAL FINDINGS OF BLOOD CHEMISTRY: ICD-10-CM

## 2024-08-20 LAB
25(OH)D3 SERPL-MCNC: 39.3 NG/ML
ALBUMIN SERPL ELPH-MCNC: 3.7 G/DL
ALP BLD-CCNC: 115 U/L
ALT SERPL-CCNC: 20 U/L
ANION GAP SERPL CALC-SCNC: 17 MMOL/L
AST SERPL-CCNC: 19 U/L
BASOPHILS # BLD AUTO: 0.06 K/UL
BASOPHILS NFR BLD AUTO: 0.3 %
BILIRUB SERPL-MCNC: 0.4 MG/DL
BUN SERPL-MCNC: 20 MG/DL
CALCIUM SERPL-MCNC: 9.7 MG/DL
CHLORIDE SERPL-SCNC: 96 MMOL/L
CHOLEST SERPL-MCNC: 147 MG/DL
CO2 SERPL-SCNC: 23 MMOL/L
CREAT SERPL-MCNC: 0.84 MG/DL
EGFR: 66 ML/MIN/1.73M2
EOSINOPHIL # BLD AUTO: 0.01 K/UL
EOSINOPHIL NFR BLD AUTO: 0.1 %
ESTIMATED AVERAGE GLUCOSE: 131 MG/DL
GLUCOSE SERPL-MCNC: 175 MG/DL
HBA1C MFR BLD HPLC: 6.2 %
HCT VFR BLD CALC: 36.2 %
HDLC SERPL-MCNC: 60 MG/DL
HGB BLD-MCNC: 10.8 G/DL
IMM GRANULOCYTES NFR BLD AUTO: 0.3 %
LDLC SERPL CALC-MCNC: 74 MG/DL
LYMPHOCYTES # BLD AUTO: 0.91 K/UL
LYMPHOCYTES NFR BLD AUTO: 5.2 %
MAN DIFF?: NORMAL
MCHC RBC-ENTMCNC: 21.3 PG
MCHC RBC-ENTMCNC: 29.8 GM/DL
MCV RBC AUTO: 71.5 FL
MONOCYTES # BLD AUTO: 1.47 K/UL
MONOCYTES NFR BLD AUTO: 8.4 %
NEUTROPHILS # BLD AUTO: 14.9 K/UL
NEUTROPHILS NFR BLD AUTO: 85.7 %
NONHDLC SERPL-MCNC: 88 MG/DL
NT-PROBNP SERPL-MCNC: 974 PG/ML
PLATELET # BLD AUTO: 601 K/UL
POTASSIUM SERPL-SCNC: 4.7 MMOL/L
PROT SERPL-MCNC: 7.5 G/DL
RBC # BLD: 5.06 M/UL
RBC # FLD: 18.8 %
SODIUM SERPL-SCNC: 136 MMOL/L
TRIGL SERPL-MCNC: 71 MG/DL
TSH SERPL-ACNC: 2.27 UIU/ML
VIT B12 SERPL-MCNC: 1199 PG/ML
WBC # FLD AUTO: 17.41 K/UL

## 2024-08-21 ENCOUNTER — APPOINTMENT (OUTPATIENT)
Dept: INTERNAL MEDICINE | Facility: CLINIC | Age: 89
End: 2024-08-21
Payer: MEDICARE

## 2024-08-21 ENCOUNTER — LABORATORY RESULT (OUTPATIENT)
Age: 89
End: 2024-08-21

## 2024-08-21 VITALS
OXYGEN SATURATION: 99 % | HEART RATE: 62 BPM | WEIGHT: 135 LBS | SYSTOLIC BLOOD PRESSURE: 135 MMHG | TEMPERATURE: 98 F | DIASTOLIC BLOOD PRESSURE: 69 MMHG | BODY MASS INDEX: 27.21 KG/M2 | HEIGHT: 59 IN

## 2024-08-21 DIAGNOSIS — L03.019 CELLULITIS OF UNSPECIFIED FINGER: ICD-10-CM

## 2024-08-21 PROCEDURE — G2211 COMPLEX E/M VISIT ADD ON: CPT

## 2024-08-21 PROCEDURE — 99213 OFFICE O/P EST LOW 20 MIN: CPT

## 2024-08-21 RX ORDER — DOXYCYCLINE 100 MG/1
100 CAPSULE ORAL
Qty: 14 | Refills: 0 | Status: ACTIVE | COMMUNITY
Start: 2024-08-21 | End: 1900-01-01

## 2024-08-22 NOTE — ASSESSMENT
[FreeTextEntry1] : Suspect infectious etiology versus arthritis flareup given the warmth tenderness we will treat with antibiotics for cellulitis.  Advised 3 parts water 1 part vinegar soaks.  According to aide patient has a tendency of biting nails. Discussed potential side effects including increased photosensitivity reactions.

## 2024-08-22 NOTE — PHYSICAL EXAM
[Normal] : no acute distress, well nourished, well developed and well-appearing [de-identified] : Swelling of the DIP of the first digit of the left hand pressure elicits tenderness.  No visible drainage. Phone: [phone]

## 2024-08-22 NOTE — HISTORY OF PRESENT ILLNESS
[FreeTextEntry8] : Patient presents to the office for left finger swelling, started having swelling a few weeks ago went to urgent care was given clindamycin swelling went down has recurred a few days ago.  Associated with tenderness.  Patient is accompanied by daughter and aide denies any known trauma.

## 2024-08-24 LAB
APPEARANCE: ABNORMAL
BILIRUBIN URINE: NEGATIVE
BLOOD URINE: NEGATIVE
COLOR: YELLOW
GLUCOSE QUALITATIVE U: NEGATIVE MG/DL
KETONES URINE: NEGATIVE MG/DL
LEUKOCYTE ESTERASE URINE: ABNORMAL
NITRITE URINE: POSITIVE
PH URINE: 7
PROTEIN URINE: NEGATIVE MG/DL
SPECIFIC GRAVITY URINE: 1.01
UROBILINOGEN URINE: 0.2 MG/DL

## 2024-09-03 ENCOUNTER — TRANSCRIPTION ENCOUNTER (OUTPATIENT)
Age: 89
End: 2024-09-03

## 2024-11-20 ENCOUNTER — APPOINTMENT (OUTPATIENT)
Dept: INTERNAL MEDICINE | Facility: CLINIC | Age: 89
End: 2024-11-20